# Patient Record
Sex: MALE | Race: WHITE | NOT HISPANIC OR LATINO
[De-identification: names, ages, dates, MRNs, and addresses within clinical notes are randomized per-mention and may not be internally consistent; named-entity substitution may affect disease eponyms.]

---

## 2022-03-01 ENCOUNTER — LABORATORY RESULT (OUTPATIENT)
Age: 62
End: 2022-03-01

## 2022-03-01 ENCOUNTER — APPOINTMENT (OUTPATIENT)
Dept: INTERNAL MEDICINE | Facility: CLINIC | Age: 62
End: 2022-03-01
Payer: COMMERCIAL

## 2022-03-01 VITALS
HEART RATE: 62 BPM | WEIGHT: 292 LBS | OXYGEN SATURATION: 97 % | BODY MASS INDEX: 34.48 KG/M2 | DIASTOLIC BLOOD PRESSURE: 87 MMHG | SYSTOLIC BLOOD PRESSURE: 147 MMHG | TEMPERATURE: 97.8 F | HEIGHT: 77 IN

## 2022-03-01 DIAGNOSIS — Z78.9 OTHER SPECIFIED HEALTH STATUS: ICD-10-CM

## 2022-03-01 DIAGNOSIS — G47.33 OBSTRUCTIVE SLEEP APNEA (ADULT) (PEDIATRIC): ICD-10-CM

## 2022-03-01 DIAGNOSIS — Z87.891 PERSONAL HISTORY OF NICOTINE DEPENDENCE: ICD-10-CM

## 2022-03-01 DIAGNOSIS — Z83.3 FAMILY HISTORY OF DIABETES MELLITUS: ICD-10-CM

## 2022-03-01 DIAGNOSIS — Z99.89 OBSTRUCTIVE SLEEP APNEA (ADULT) (PEDIATRIC): ICD-10-CM

## 2022-03-01 PROCEDURE — 99396 PREV VISIT EST AGE 40-64: CPT | Mod: 25

## 2022-03-01 PROCEDURE — 36415 COLL VENOUS BLD VENIPUNCTURE: CPT

## 2022-03-01 RX ORDER — FLUTICASONE FUROATE AND VILANTEROL TRIFENATATE 200; 25 UG/1; UG/1
200-25 POWDER RESPIRATORY (INHALATION)
Refills: 0 | Status: ACTIVE | COMMUNITY

## 2022-03-01 RX ORDER — ALBUTEROL SULFATE 90 UG/1
108 (90 BASE) INHALANT RESPIRATORY (INHALATION) 3 TIMES DAILY
Qty: 1 | Refills: 5 | Status: ACTIVE | COMMUNITY
Start: 2022-03-01 | End: 1900-01-01

## 2022-03-01 NOTE — HEALTH RISK ASSESSMENT
[Fair] : ~his/her~ current health as fair  [Good] : ~his/her~  mood as  good [Former] : Former [Yes] : Yes [No falls in past year] : Patient reported no falls in the past year [0] : 2) Feeling down, depressed, or hopeless: Not at all (0) [PHQ-2 Negative - No further assessment needed] : PHQ-2 Negative - No further assessment needed [HIV Test offered] : HIV Test offered [Hepatitis C test offered] : Hepatitis C test offered [With Family] : lives with family [# of Members in Household ___] :  household currently consist of [unfilled] member(s) [Employed] : employed [High School] : high school [] :  [Sexually Active] : sexually active [Fully functional (bathing, dressing, toileting, transferring, walking, feeding)] : Fully functional (bathing, dressing, toileting, transferring, walking, feeding) [Fully functional (using the telephone, shopping, preparing meals, housekeeping, doing laundry, using] : Fully functional and needs no help or supervision to perform IADLs (using the telephone, shopping, preparing meals, housekeeping, doing laundry, using transportation, managing medications and managing finances) [Reports normal functional visual acuity (ie: able to read med bottle)] : Reports normal functional visual acuity [Smoke Detector] : smoke detector [Carbon Monoxide Detector] : carbon monoxide detector [Safety elements used in home] : safety elements used in home [Seat Belt] :  uses seat belt [Sunscreen] : uses sunscreen [de-identified] : socially [[id number]] : 0 [Change in mental status noted] : No change in mental status noted [Language] : denies difficulty with language [Behavior] : denies difficulty with behavior [Handling Complex Tasks] : denies difficulty handling complex tasks [Learning/Retaining New Information] : denies difficulty learning/retaining new information [Spatial Ability and Orientation] : denies difficulty with spatial ability and orientation [High Risk Behavior] : no high risk behavior [Reports changes in hearing] : Reports no changes in hearing [Reports changes in vision] : Reports no changes in vision [Reports changes in dental health] : Reports no changes in dental health [Guns at Home] : no guns at home [TB Exposure] : is not being exposed to tuberculosis [ColonoscopyDate] : 2019 [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

## 2022-03-01 NOTE — HISTORY OF PRESENT ILLNESS
[de-identified] : Mr. SHAYNE PLASENCIA 61 year  male  with a PMH DM2, HTN, Hyperlipidemia, Asthma, S/p aortic  valve replacement, JAVIER on a CPAP   present to the office for a physical exam. Needs rx refills on his meds.   Patient feels good, denies complaints at present time.  Patient completed his cardiac rehab after surgery. Recently was seen by an endocrinologist(was told that he will add trulicity to his regimenet). Was seen by a pulmonologist, stop flovent, start Breo. As per patient feels better with his breathing. Patient mention that in January had a COVID 19, stay home for 5 days. Currently feels good. Needs refills on his meds\par

## 2022-03-01 NOTE — PLAN
[FreeTextEntry1] : Well adult exam is performed. Recommend  to do a blood test today\par For a HTN continue metoprolol, hyzaar, low salt diet, decrease caffeine intake\par For a DM2, continue janumet, glipizide, f/u with endo, podiatrist, ophtalmologist\par For a hyperlipidemia, continue low cholesterol diet, lipitor and zetia\par For an asthma, continue albuterol, breo\par F/u with a cardiologist\par  RTC to f/u in 2 wks. Patient is verbalized understanding\par

## 2022-03-03 ENCOUNTER — NON-APPOINTMENT (OUTPATIENT)
Age: 62
End: 2022-03-03

## 2022-03-03 LAB
ALBUMIN SERPL ELPH-MCNC: 4.7 G/DL
ALP BLD-CCNC: 76 U/L
ALT SERPL-CCNC: 22 U/L
ANION GAP SERPL CALC-SCNC: 18 MMOL/L
AST SERPL-CCNC: 21 U/L
BASOPHILS # BLD AUTO: 0.07 K/UL
BASOPHILS NFR BLD AUTO: 0.8 %
BILIRUB SERPL-MCNC: 0.3 MG/DL
BUN SERPL-MCNC: 21 MG/DL
CALCIUM SERPL-MCNC: 9.7 MG/DL
CHLORIDE SERPL-SCNC: 105 MMOL/L
CHOLEST SERPL-MCNC: 162 MG/DL
CO2 SERPL-SCNC: 21 MMOL/L
CREAT SERPL-MCNC: 1 MG/DL
EGFR: 85 ML/MIN/1.73M2
EOSINOPHIL # BLD AUTO: 0.24 K/UL
EOSINOPHIL NFR BLD AUTO: 2.6 %
ESTIMATED AVERAGE GLUCOSE: 148 MG/DL
GLUCOSE SERPL-MCNC: 81 MG/DL
HBA1C MFR BLD HPLC: 6.8 %
HBV SURFACE AB SER QL: REACTIVE
HBV SURFACE AG SER QL: NONREACTIVE
HCT VFR BLD CALC: 40.9 %
HCV AB SER QL: REACTIVE
HCV S/CO RATIO: 11.5 S/CO
HDLC SERPL-MCNC: 51 MG/DL
HGB BLD-MCNC: 13.3 G/DL
HIV1+2 AB SPEC QL IA.RAPID: NONREACTIVE
IMM GRANULOCYTES NFR BLD AUTO: 0.4 %
IRON SATN MFR SERPL: 22 %
IRON SERPL-MCNC: 74 UG/DL
LDLC SERPL CALC-MCNC: 76 MG/DL
LYMPHOCYTES # BLD AUTO: 2.32 K/UL
LYMPHOCYTES NFR BLD AUTO: 25.3 %
MAN DIFF?: NORMAL
MCHC RBC-ENTMCNC: 29.2 PG
MCHC RBC-ENTMCNC: 32.5 GM/DL
MCV RBC AUTO: 89.9 FL
MONOCYTES # BLD AUTO: 0.73 K/UL
MONOCYTES NFR BLD AUTO: 8 %
NEUTROPHILS # BLD AUTO: 5.76 K/UL
NEUTROPHILS NFR BLD AUTO: 62.9 %
NONHDLC SERPL-MCNC: 111 MG/DL
PLATELET # BLD AUTO: 230 K/UL
POTASSIUM SERPL-SCNC: 4.5 MMOL/L
PROT SERPL-MCNC: 7.2 G/DL
PSA FREE FLD-MCNC: 36 %
PSA FREE SERPL-MCNC: 0.22 NG/ML
PSA SERPL-MCNC: 0.62 NG/ML
RBC # BLD: 4.55 M/UL
RBC # FLD: 13.2 %
SODIUM SERPL-SCNC: 143 MMOL/L
TIBC SERPL-MCNC: 338 UG/DL
TRIGL SERPL-MCNC: 171 MG/DL
TSH SERPL-ACNC: 2.56 UIU/ML
UIBC SERPL-MCNC: 264 UG/DL
WBC # FLD AUTO: 9.16 K/UL

## 2022-08-10 ENCOUNTER — OUTPATIENT (OUTPATIENT)
Dept: OUTPATIENT SERVICES | Facility: HOSPITAL | Age: 62
LOS: 1 days | End: 2022-08-10

## 2022-08-10 PROCEDURE — 73030 X-RAY EXAM OF SHOULDER: CPT | Mod: 26,RT

## 2022-10-12 RX ORDER — MONTELUKAST 10 MG/1
10 TABLET, FILM COATED ORAL DAILY
Qty: 90 | Refills: 1 | Status: ACTIVE | COMMUNITY
Start: 2022-03-01 | End: 1900-01-01

## 2022-11-21 ENCOUNTER — APPOINTMENT (OUTPATIENT)
Dept: INTERNAL MEDICINE | Facility: CLINIC | Age: 62
End: 2022-11-21

## 2022-11-21 VITALS
BODY MASS INDEX: 38.74 KG/M2 | WEIGHT: 286 LBS | DIASTOLIC BLOOD PRESSURE: 83 MMHG | HEIGHT: 72 IN | TEMPERATURE: 97.6 F | SYSTOLIC BLOOD PRESSURE: 143 MMHG | OXYGEN SATURATION: 97 % | HEART RATE: 56 BPM

## 2022-11-21 VITALS — DIASTOLIC BLOOD PRESSURE: 80 MMHG | SYSTOLIC BLOOD PRESSURE: 135 MMHG

## 2022-11-21 DIAGNOSIS — B19.20 UNSPECIFIED VIRAL HEPATITIS C W/OUT HEPATIC COMA: ICD-10-CM

## 2022-11-21 DIAGNOSIS — Z13.21 ENCOUNTER FOR SCREENING FOR NUTRITIONAL DISORDER: ICD-10-CM

## 2022-11-21 PROCEDURE — 99214 OFFICE O/P EST MOD 30 MIN: CPT

## 2022-11-21 NOTE — HISTORY OF PRESENT ILLNESS
[FreeTextEntry1] : F/u exam [de-identified] : Mr. SHAYNE PLASENCIA 62 year male with a PMH DM2, HTN, Hyperlipidemia, Asthma, S/p aortic valve replacement, JAVIER on a CPAP present to the office for a f/u. Patient did not come to f/u for a long period of time due to insurance issue. As per patient he feels good, denies complaints. As per patient got a flu vaccine, shingrix vaccine and booster Pfizer\par Needs referral for  a blood test and to get refills on his meds

## 2022-11-21 NOTE — PLAN
[FreeTextEntry1] : F/u  exam is performed. Recommend  to do a blood test(will do another time, fasting), further management will depend on the blood test results.\par Continue present meds\par BP is well controlled continue hyzaar 50/12.5mg, metoprolol\par For DM2 continue janumet, glipizide. f/u with endo, podiatrist, ophtalmologist\par For a hyperlipidemia continue lipitor\par For an asthma continue breo elipta, albuterol, singulair.\par RTC to f/u in 2 wks after blood test. Patient is verbalized understanding\par

## 2022-12-29 ENCOUNTER — LABORATORY RESULT (OUTPATIENT)
Age: 62
End: 2022-12-29

## 2022-12-30 LAB
25(OH)D3 SERPL-MCNC: 52.7 NG/ML
ALBUMIN SERPL ELPH-MCNC: 4.5 G/DL
ALP BLD-CCNC: 101 U/L
ALT SERPL-CCNC: 21 U/L
ANION GAP SERPL CALC-SCNC: 11 MMOL/L
AST SERPL-CCNC: 17 U/L
BASOPHILS # BLD AUTO: 0.12 K/UL
BASOPHILS NFR BLD AUTO: 1 %
BILIRUB SERPL-MCNC: 0.2 MG/DL
BUN SERPL-MCNC: 30 MG/DL
CALCIUM SERPL-MCNC: 9.8 MG/DL
CHLORIDE SERPL-SCNC: 104 MMOL/L
CHOLEST SERPL-MCNC: 178 MG/DL
CO2 SERPL-SCNC: 26 MMOL/L
CREAT SERPL-MCNC: 1.18 MG/DL
CREAT SPEC-SCNC: 171 MG/DL
EGFR: 69 ML/MIN/1.73M2
EOSINOPHIL # BLD AUTO: 0.3 K/UL
EOSINOPHIL NFR BLD AUTO: 2.5 %
ESTIMATED AVERAGE GLUCOSE: 146 MG/DL
GLUCOSE SERPL-MCNC: 117 MG/DL
HBA1C MFR BLD HPLC: 6.7 %
HCT VFR BLD CALC: 43.8 %
HCV AB SER QL: REACTIVE
HCV RNA SERPL NAA+PROBE-LOG IU: NOT DETECTED LOGIU/ML
HCV S/CO RATIO: 10.29 S/CO
HDLC SERPL-MCNC: 46 MG/DL
HEPC RNA INTERP: NOT DETECTED
HGB BLD-MCNC: 13.7 G/DL
IMM GRANULOCYTES NFR BLD AUTO: 1.1 %
IRON SATN MFR SERPL: 21 %
IRON SERPL-MCNC: 58 UG/DL
LDLC SERPL CALC-MCNC: 103 MG/DL
LYMPHOCYTES # BLD AUTO: 3.19 K/UL
LYMPHOCYTES NFR BLD AUTO: 26.9 %
MAN DIFF?: NORMAL
MCHC RBC-ENTMCNC: 29.8 PG
MCHC RBC-ENTMCNC: 31.3 GM/DL
MCV RBC AUTO: 95.4 FL
MICROALBUMIN 24H UR DL<=1MG/L-MCNC: 1.3 MG/DL
MICROALBUMIN/CREAT 24H UR-RTO: 8 MG/G
MONOCYTES # BLD AUTO: 0.87 K/UL
MONOCYTES NFR BLD AUTO: 7.3 %
NEUTROPHILS # BLD AUTO: 7.24 K/UL
NEUTROPHILS NFR BLD AUTO: 61.2 %
NONHDLC SERPL-MCNC: 133 MG/DL
PLATELET # BLD AUTO: 263 K/UL
POTASSIUM SERPL-SCNC: 5.8 MMOL/L
PROT SERPL-MCNC: 6.9 G/DL
RBC # BLD: 4.59 M/UL
RBC # FLD: 13.3 %
SODIUM SERPL-SCNC: 141 MMOL/L
TIBC SERPL-MCNC: 284 UG/DL
TRIGL SERPL-MCNC: 148 MG/DL
TSH SERPL-ACNC: 2.19 UIU/ML
UIBC SERPL-MCNC: 226 UG/DL
WBC # FLD AUTO: 11.85 K/UL

## 2023-01-07 DIAGNOSIS — U07.1 COVID-19: ICD-10-CM

## 2023-03-01 ENCOUNTER — APPOINTMENT (OUTPATIENT)
Dept: INTERNAL MEDICINE | Facility: CLINIC | Age: 63
End: 2023-03-01

## 2023-03-01 ENCOUNTER — APPOINTMENT (OUTPATIENT)
Dept: INTERNAL MEDICINE | Facility: CLINIC | Age: 63
End: 2023-03-01
Payer: COMMERCIAL

## 2023-03-01 VITALS
WEIGHT: 272 LBS | BODY MASS INDEX: 36.84 KG/M2 | DIASTOLIC BLOOD PRESSURE: 88 MMHG | OXYGEN SATURATION: 98 % | HEART RATE: 58 BPM | HEIGHT: 72 IN | SYSTOLIC BLOOD PRESSURE: 135 MMHG

## 2023-03-01 DIAGNOSIS — J06.9 ACUTE UPPER RESPIRATORY INFECTION, UNSPECIFIED: ICD-10-CM

## 2023-03-01 PROCEDURE — 99213 OFFICE O/P EST LOW 20 MIN: CPT

## 2023-03-01 RX ORDER — BLOOD-GLUCOSE METER
EACH MISCELLANEOUS
Qty: 1 | Refills: 0 | Status: ACTIVE | COMMUNITY
Start: 2023-03-01 | End: 1900-01-01

## 2023-03-01 RX ORDER — BLOOD SUGAR DIAGNOSTIC
STRIP MISCELLANEOUS 3 TIMES DAILY
Qty: 1 | Refills: 1 | Status: ACTIVE | COMMUNITY
Start: 2023-03-01 | End: 1900-01-01

## 2023-03-01 RX ORDER — LANCETS 30 GAUGE
EACH MISCELLANEOUS
Qty: 100 | Refills: 1 | Status: ACTIVE | COMMUNITY
Start: 2023-03-01 | End: 1900-01-01

## 2023-03-01 RX ORDER — BLOOD-GLUCOSE METER
70 EACH MISCELLANEOUS
Qty: 1 | Refills: 2 | Status: ACTIVE | COMMUNITY
Start: 2023-03-01 | End: 1900-01-01

## 2023-03-02 RX ORDER — BLOOD-GLUCOSE METER
EACH MISCELLANEOUS
Qty: 1 | Refills: 2 | Status: ACTIVE | COMMUNITY
Start: 2023-03-02 | End: 1900-01-01

## 2023-03-02 RX ORDER — BLOOD-GLUCOSE METER
EACH MISCELLANEOUS
Qty: 1 | Refills: 1 | Status: ACTIVE | COMMUNITY
Start: 2023-03-02 | End: 1900-01-01

## 2023-03-02 RX ORDER — BLOOD-GLUCOSE METER
EACH MISCELLANEOUS
Qty: 1 | Refills: 0 | Status: ACTIVE | COMMUNITY
Start: 2023-03-02 | End: 1900-01-01

## 2023-06-12 ENCOUNTER — APPOINTMENT (OUTPATIENT)
Dept: INTERNAL MEDICINE | Facility: CLINIC | Age: 63
End: 2023-06-12
Payer: COMMERCIAL

## 2023-06-12 VITALS
TEMPERATURE: 97.8 F | HEIGHT: 72 IN | HEART RATE: 56 BPM | WEIGHT: 273 LBS | DIASTOLIC BLOOD PRESSURE: 80 MMHG | OXYGEN SATURATION: 98 % | SYSTOLIC BLOOD PRESSURE: 139 MMHG | BODY MASS INDEX: 36.98 KG/M2

## 2023-06-12 DIAGNOSIS — Z23 ENCOUNTER FOR IMMUNIZATION: ICD-10-CM

## 2023-06-12 PROCEDURE — 99214 OFFICE O/P EST MOD 30 MIN: CPT | Mod: 25

## 2023-06-12 PROCEDURE — G0009: CPT

## 2023-06-12 PROCEDURE — 36415 COLL VENOUS BLD VENIPUNCTURE: CPT

## 2023-06-12 PROCEDURE — 90677 PCV20 VACCINE IM: CPT

## 2023-06-12 RX ORDER — METFORMIN HYDROCHLORIDE 1000 MG/1
1000 TABLET, COATED ORAL
Refills: 0 | Status: ACTIVE | COMMUNITY

## 2023-06-12 RX ORDER — FLUTICASONE FUROATE, UMECLIDINIUM BROMIDE AND VILANTEROL TRIFENATATE 200; 62.5; 25 UG/1; UG/1; UG/1
POWDER RESPIRATORY (INHALATION)
Refills: 0 | Status: ACTIVE | COMMUNITY

## 2023-06-12 NOTE — PLAN
[FreeTextEntry1] : Mr. SHAYNE PLASENCIA 63 year male with a PMH DM2, HTN, Hyperlipidemia, Asthma, S/p aortic valve replacement, JAVIER on a CPAP present to the office for a f/u.\par F/u exam is performed. Recommend to do a blood test(will do another time, fasting), further management will depend on the blood test results.\par Continue present meds\par BP is well controlled continue hyzaar 50/12.5mg, metoprolol\par For DM2 continue metformin, monjuro,  f/u with endo, podiatrist, ophtalmologist\par For a hyperlipidemia continue lipitor\par For an asthma continue trelegy, albuterol\par Patient will get  a prevnar 20 today(side effect of the vaccine explained to the patient)\par RTC to f/u in 2 wks after blood test. Patient is verbalized understanding

## 2023-06-12 NOTE — HISTORY OF PRESENT ILLNESS
[FreeTextEntry1] : F/u exam [de-identified] : Mr. SHAYNE PLASENCIA 63 year male with a PMH DM2, HTN, Hyperlipidemia, Asthma, S/p aortic valve replacement, JAVIER on a CPAP present to the office for a f/u. Wants to do a blood test.  As per patient he feels good, denies complaints. Recently was seen by endocrinologist, made changes on DM2 meds. Start patient on Monjuro 7.5, metfformin. D/c janumet and glipizide\par Patient currently doing PT for a L knee pain. Feels better no more pain

## 2023-06-14 LAB
ALBUMIN SERPL ELPH-MCNC: 4.7 G/DL
ALP BLD-CCNC: 47 U/L
ALT SERPL-CCNC: 22 U/L
ANION GAP SERPL CALC-SCNC: 14 MMOL/L
AST SERPL-CCNC: 22 U/L
BILIRUB SERPL-MCNC: 0.3 MG/DL
BUN SERPL-MCNC: 27 MG/DL
CALCIUM SERPL-MCNC: 9.7 MG/DL
CHLORIDE SERPL-SCNC: 105 MMOL/L
CHOLEST SERPL-MCNC: 217 MG/DL
CO2 SERPL-SCNC: 22 MMOL/L
CREAT SERPL-MCNC: 1.36 MG/DL
EGFR: 58 ML/MIN/1.73M2
ESTIMATED AVERAGE GLUCOSE: 157 MG/DL
GLUCOSE SERPL-MCNC: 162 MG/DL
HBA1C MFR BLD HPLC: 7.1 %
HDLC SERPL-MCNC: 53 MG/DL
LDLC SERPL CALC-MCNC: 142 MG/DL
NONHDLC SERPL-MCNC: 163 MG/DL
POTASSIUM SERPL-SCNC: 4.9 MMOL/L
PROT SERPL-MCNC: 7.2 G/DL
SODIUM SERPL-SCNC: 142 MMOL/L
TRIGL SERPL-MCNC: 106 MG/DL
TSH SERPL-ACNC: 1.89 UIU/ML

## 2023-10-09 ENCOUNTER — APPOINTMENT (OUTPATIENT)
Dept: INTERNAL MEDICINE | Facility: CLINIC | Age: 63
End: 2023-10-09
Payer: COMMERCIAL

## 2023-10-09 VITALS
DIASTOLIC BLOOD PRESSURE: 67 MMHG | WEIGHT: 263 LBS | OXYGEN SATURATION: 96 % | HEIGHT: 72 IN | BODY MASS INDEX: 35.62 KG/M2 | SYSTOLIC BLOOD PRESSURE: 124 MMHG | HEART RATE: 59 BPM

## 2023-10-09 PROCEDURE — 36415 COLL VENOUS BLD VENIPUNCTURE: CPT

## 2023-10-09 PROCEDURE — 99213 OFFICE O/P EST LOW 20 MIN: CPT | Mod: 25

## 2023-10-12 LAB
ALBUMIN SERPL ELPH-MCNC: 4.6 G/DL
ALP BLD-CCNC: 43 U/L
ALT SERPL-CCNC: 23 U/L
ANION GAP SERPL CALC-SCNC: 11 MMOL/L
AST SERPL-CCNC: 21 U/L
BILIRUB DIRECT SERPL-MCNC: 0.2 MG/DL
BILIRUB INDIRECT SERPL-MCNC: 0.3 MG/DL
BILIRUB SERPL-MCNC: 0.5 MG/DL
BUN SERPL-MCNC: 27 MG/DL
CALCIUM SERPL-MCNC: 9.7 MG/DL
CHLORIDE SERPL-SCNC: 104 MMOL/L
CHOLEST SERPL-MCNC: 150 MG/DL
CO2 SERPL-SCNC: 25 MMOL/L
CREAT SERPL-MCNC: 1.32 MG/DL
EGFR: 60 ML/MIN/1.73M2
ESTIMATED AVERAGE GLUCOSE: 143 MG/DL
GLUCOSE SERPL-MCNC: 163 MG/DL
HBA1C MFR BLD HPLC: 6.6 %
HDLC SERPL-MCNC: 50 MG/DL
LDLC SERPL CALC-MCNC: 82 MG/DL
NONHDLC SERPL-MCNC: 100 MG/DL
POTASSIUM SERPL-SCNC: 4.6 MMOL/L
PROT SERPL-MCNC: 7.1 G/DL
SODIUM SERPL-SCNC: 141 MMOL/L
TRIGL SERPL-MCNC: 97 MG/DL

## 2024-02-24 ENCOUNTER — NON-APPOINTMENT (OUTPATIENT)
Age: 64
End: 2024-02-24

## 2024-03-07 ENCOUNTER — APPOINTMENT (OUTPATIENT)
Dept: INTERNAL MEDICINE | Facility: CLINIC | Age: 64
End: 2024-03-07
Payer: COMMERCIAL

## 2024-03-07 VITALS
HEIGHT: 72 IN | DIASTOLIC BLOOD PRESSURE: 77 MMHG | WEIGHT: 243 LBS | HEART RATE: 59 BPM | SYSTOLIC BLOOD PRESSURE: 123 MMHG | OXYGEN SATURATION: 98 % | BODY MASS INDEX: 32.91 KG/M2

## 2024-03-07 DIAGNOSIS — J45.909 UNSPECIFIED ASTHMA, UNCOMPLICATED: ICD-10-CM

## 2024-03-07 DIAGNOSIS — D72.829 ELEVATED WHITE BLOOD CELL COUNT, UNSPECIFIED: ICD-10-CM

## 2024-03-07 DIAGNOSIS — Z09 ENCOUNTER FOR FOLLOW-UP EXAMINATION AFTER COMPLETED TREATMENT FOR CONDITIONS OTHER THAN MALIGNANT NEOPLASM: ICD-10-CM

## 2024-03-07 DIAGNOSIS — Z12.5 ENCOUNTER FOR SCREENING FOR MALIGNANT NEOPLASM OF PROSTATE: ICD-10-CM

## 2024-03-07 PROCEDURE — 99496 TRANSJ CARE MGMT HIGH F2F 7D: CPT | Mod: 25

## 2024-03-07 PROCEDURE — 36415 COLL VENOUS BLD VENIPUNCTURE: CPT

## 2024-03-07 RX ORDER — ROSUVASTATIN CALCIUM 40 MG/1
40 TABLET, FILM COATED ORAL
Refills: 0 | Status: ACTIVE | COMMUNITY

## 2024-03-07 RX ORDER — LEVETIRACETAM 500 MG/1
500 TABLET, FILM COATED ORAL TWICE DAILY
Refills: 0 | Status: ACTIVE | COMMUNITY

## 2024-03-11 LAB
PSA FREE FLD-MCNC: 27 %
PSA FREE SERPL-MCNC: 0.49 NG/ML
PSA SERPL-MCNC: 1.8 NG/ML

## 2024-03-11 NOTE — HISTORY OF PRESENT ILLNESS
[Post-hospitalization from ___ Hospital] : Post-hospitalization from [unfilled] Hospital [Discharged on ___] : discharged on [unfilled] [Admitted on: ___] : The patient was admitted on [unfilled] [FreeTextEntry2] : Mr. SHAYNE PLASENCIA 63 year male with a PMH DM2, HTN, Hyperlipidemia, Asthma, S/p aortic valve replacement, JAVIER on a CPAP present to the office for a f/u after hospital d/c. As per patient on 03/02/24 had a seizure episode while sleeping, his wife called 911. Patient was transferred to [facility]. Did  ct scan of the head, MRI report, EEG, TTE, did blood test. For  a new onset of seizure start patient on keppra 500mg bid.. 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 CADASIL or other generic arteriopathies. Dz also with an acute kidney injury, after hydration renal function is improved. Creatinine level upon discharge on 03/04/24 was 1.13, Has a slight anemia Hb 11.3, HbA1C is 6.4, BNP is 634

## 2024-03-11 NOTE — PLAN
[FreeTextEntry1] : Mr. SHAYNE PLASENCIA 63 year male with a PMH DM2, HTN, Hyperlipidemia, Asthma, S/p aortic valve replacement, JAVIER on a CPAP present to the office for a f/u after hospital d/c. Continue present meds Seizure continue Keppra, f/u with neurologist. Avoid driving for 6 mo BP is well controlled continue hyzaar 50/12.5mg, metoprolol For DM2 continue metformin, monjuro, f/u with endo, podiatrist, ophtalmologist For a hyperlipidemia continue crestor, zetia, tricor For an asthma continue trelegy, albuterol F/u with GI - screening colonoscopy F/u with urologist(c/o nocturia).  RTC to f/u in 3 mo. Patient is verbalized understanding.

## 2024-03-11 NOTE — PHYSICAL EXAM
[TextEntry] : Constitutional: Well appearing, not in acute distress Head: Normocephalic, no lesions Eyes: PERRLA, conjunctiva is NL Ear: Ear canal is normal, tympanic membrane is intact Nose: Nasal turbinates are NL Throat: Clear, no exudates, no lesions Neck: Supple, no masses Chest: Lungs are clear, no rales, no rhonchi, no wheezing Heart: Regular rate, no murmurs Abdomen: Soft, no tenderness : No CVAT Neuro: AAO x 3, no focal neurological deficit.

## 2024-03-11 NOTE — REVIEW OF SYSTEMS
[Nocturia] : nocturia [Negative] : Heme/Lymph [Dysuria] : no dysuria [Frequency] : no frequency [Incontinence] : no incontinence

## 2024-03-14 ENCOUNTER — APPOINTMENT (OUTPATIENT)
Dept: INTERNAL MEDICINE | Facility: CLINIC | Age: 64
End: 2024-03-14
Payer: COMMERCIAL

## 2024-03-14 VITALS
DIASTOLIC BLOOD PRESSURE: 69 MMHG | SYSTOLIC BLOOD PRESSURE: 114 MMHG | BODY MASS INDEX: 32.91 KG/M2 | OXYGEN SATURATION: 97 % | HEART RATE: 61 BPM | WEIGHT: 243 LBS | HEIGHT: 72 IN

## 2024-03-14 DIAGNOSIS — I10 ESSENTIAL (PRIMARY) HYPERTENSION: ICD-10-CM

## 2024-03-14 DIAGNOSIS — E78.5 HYPERLIPIDEMIA, UNSPECIFIED: ICD-10-CM

## 2024-03-14 DIAGNOSIS — R56.9 UNSPECIFIED CONVULSIONS: ICD-10-CM

## 2024-03-14 DIAGNOSIS — E11.9 TYPE 2 DIABETES MELLITUS W/OUT COMPLICATIONS: ICD-10-CM

## 2024-03-14 PROCEDURE — 99204 OFFICE O/P NEW MOD 45 MIN: CPT

## 2024-03-14 RX ORDER — ATORVASTATIN CALCIUM 40 MG/1
40 TABLET, FILM COATED ORAL
Qty: 90 | Refills: 1 | Status: DISCONTINUED | COMMUNITY
End: 2024-03-14

## 2024-03-14 RX ORDER — TIRZEPATIDE 12.5 MG/.5ML
12.5 INJECTION, SOLUTION SUBCUTANEOUS
Refills: 0 | Status: ACTIVE | COMMUNITY

## 2024-03-14 RX ORDER — EZETIMIBE 10 MG/1
10 TABLET ORAL
Refills: 0 | Status: ACTIVE | COMMUNITY

## 2024-03-14 NOTE — ASSESSMENT
[FreeTextEntry1] : Colonoscopy full risk and benefits explained need to hold mounjaro and needs neuro clearance

## 2024-03-14 NOTE — PHYSICAL EXAM

## 2024-03-14 NOTE — HISTORY OF PRESENT ILLNESS
[FreeTextEntry1] : Mr. SHAYNE PLASENCIA 63 year male with a PMH DM2, HTN, Hyperlipidemia, Asthma, S/p bioprosthetic aortic valve replacement, JAVIER that has improved since losing weight. No h/o MI and normal Coronary arteries. S/p seizure disorder that started last week. He was hospitalized and is now seeing a neurologist. told has genetic disease. he is on seizure meds at present Here to schedule a screening colonoscopy. He has had a few colonoscopies in the past. Last colonoscopy a few years ago. He has had polyps in the past.   lost 50 pounds on Mounjaro.

## 2024-03-18 RX ORDER — METOPROLOL SUCCINATE 25 MG/1
25 TABLET, EXTENDED RELEASE ORAL
Qty: 90 | Refills: 1 | Status: ACTIVE | COMMUNITY
Start: 1900-01-01 | End: 1900-01-01

## 2024-03-18 RX ORDER — LOSARTAN POTASSIUM AND HYDROCHLOROTHIAZIDE 12.5; 5 MG/1; MG/1
50-12.5 TABLET ORAL DAILY
Qty: 90 | Refills: 1 | Status: ACTIVE | COMMUNITY
Start: 2022-03-01 | End: 1900-01-01

## 2024-03-27 DIAGNOSIS — Z00.00 ENCOUNTER FOR GENERAL ADULT MEDICAL EXAMINATION W/OUT ABNORMAL FINDINGS: ICD-10-CM

## 2024-04-02 ENCOUNTER — APPOINTMENT (OUTPATIENT)
Dept: UROLOGY | Facility: CLINIC | Age: 64
End: 2024-04-02
Payer: COMMERCIAL

## 2024-04-02 VITALS
TEMPERATURE: 97.5 F | OXYGEN SATURATION: 95 % | WEIGHT: 245 LBS | BODY MASS INDEX: 33.18 KG/M2 | DIASTOLIC BLOOD PRESSURE: 65 MMHG | SYSTOLIC BLOOD PRESSURE: 118 MMHG | HEIGHT: 72 IN | HEART RATE: 69 BPM

## 2024-04-02 DIAGNOSIS — R35.1 NOCTURIA: ICD-10-CM

## 2024-04-02 PROCEDURE — 99203 OFFICE O/P NEW LOW 30 MIN: CPT

## 2024-04-02 NOTE — PHYSICAL EXAM
[Well Groomed] : well groomed [Normal Appearance] : normal appearance [General Appearance - In No Acute Distress] : no acute distress [Edema] : no peripheral edema [Respiration, Rhythm And Depth] : normal respiratory rhythm and effort [Exaggerated Use Of Accessory Muscles For Inspiration] : no accessory muscle use [Abdomen Soft] : soft [Abdomen Tenderness] : non-tender [Costovertebral Angle Tenderness] : no ~M costovertebral angle tenderness [Urinary Bladder Findings] : the bladder was normal on palpation [Normal Station and Gait] : the gait and station were normal for the patient's age [] : no rash [No Focal Deficits] : no focal deficits [Affect] : the affect was normal [Oriented To Time, Place, And Person] : oriented to person, place, and time [Mood] : the mood was normal [No Palpable Adenopathy] : no palpable adenopathy

## 2024-04-02 NOTE — HISTORY OF PRESENT ILLNESS
[FreeTextEntry1] : 64y/o male here for nocturia X2/3. Denies h/o urinary stone, no FHx of PCA No episodes of hematuria. Last PSA 1.8

## 2024-04-02 NOTE — ASSESSMENT
[FreeTextEntry1] : 64y/o male here for nocturia X2/3. Denies h/o urinary stone, no FHx of PCA No episodes of hematuria. Last PSA 1.8  Prescribing Tamsulosin 0.4 Ordering TRUS to evaluate dutasteride  Collecting urine for UA and Culture  Will F/U in case of positive results

## 2024-04-08 LAB
APPEARANCE: CLEAR
BACTERIA UR CULT: NORMAL
BACTERIA: NEGATIVE /HPF
BILIRUBIN URINE: NEGATIVE
BLOOD URINE: NEGATIVE
CAST: 0 /LPF
COLOR: NORMAL
EPITHELIAL CELLS: 3 /HPF
GLUCOSE QUALITATIVE U: NEGATIVE MG/DL
KETONES URINE: ABNORMAL MG/DL
LEUKOCYTE ESTERASE URINE: ABNORMAL
MICROSCOPIC-UA: NORMAL
NITRITE URINE: NEGATIVE
PH URINE: 5.5
PROTEIN URINE: NEGATIVE MG/DL
RED BLOOD CELLS URINE: 1 /HPF
SPECIFIC GRAVITY URINE: 1.02
UROBILINOGEN URINE: 1 MG/DL
WHITE BLOOD CELLS URINE: 0 /HPF

## 2024-05-28 DIAGNOSIS — Z12.11 ENCOUNTER FOR SCREENING FOR MALIGNANT NEOPLASM OF COLON: ICD-10-CM

## 2024-05-28 DIAGNOSIS — D12.6 BENIGN NEOPLASM OF COLON, UNSPECIFIED: ICD-10-CM

## 2024-05-28 RX ORDER — SODIUM SULFATE, POTASSIUM SULFATE AND MAGNESIUM SULFATE 1.6; 3.13; 17.5 G/177ML; G/177ML; G/177ML
17.5-3.13-1.6 SOLUTION ORAL
Qty: 2 | Refills: 0 | Status: ACTIVE | COMMUNITY
Start: 2024-05-28 | End: 1900-01-01

## 2024-06-03 ENCOUNTER — APPOINTMENT (OUTPATIENT)
Dept: INTERNAL MEDICINE | Facility: AMBULATORY MEDICAL SERVICES | Age: 64
End: 2024-06-03
Payer: COMMERCIAL

## 2024-06-03 DIAGNOSIS — R26.89 OTHER ABNORMALITIES OF GAIT AND MOBILITY: ICD-10-CM

## 2024-06-03 DIAGNOSIS — F32.A DEPRESSION, UNSPECIFIED: ICD-10-CM

## 2024-06-03 PROCEDURE — 45385 COLONOSCOPY W/LESION REMOVAL: CPT | Mod: PT

## 2024-06-03 RX ORDER — ESCITALOPRAM OXALATE 5 MG/1
5 TABLET ORAL
Qty: 30 | Refills: 5 | Status: ACTIVE | COMMUNITY
Start: 2024-06-03 | End: 1900-01-01

## 2024-06-24 ENCOUNTER — APPOINTMENT (OUTPATIENT)
Dept: UROLOGY | Facility: CLINIC | Age: 64
End: 2024-06-24
Payer: COMMERCIAL

## 2024-06-24 VITALS
HEART RATE: 56 BPM | BODY MASS INDEX: 32.51 KG/M2 | TEMPERATURE: 97.8 F | OXYGEN SATURATION: 98 % | DIASTOLIC BLOOD PRESSURE: 57 MMHG | HEIGHT: 72 IN | SYSTOLIC BLOOD PRESSURE: 94 MMHG | WEIGHT: 240 LBS

## 2024-06-24 DIAGNOSIS — N40.0 BENIGN PROSTATIC HYPERPLASIA WITHOUT LOWER URINARY TRACT SYMPMS: ICD-10-CM

## 2024-06-24 PROCEDURE — 99213 OFFICE O/P EST LOW 20 MIN: CPT

## 2024-06-24 PROCEDURE — 76872 US TRANSRECTAL: CPT

## 2024-06-24 RX ORDER — DUTASTERIDE 0.5 MG/1
0.5 CAPSULE, LIQUID FILLED ORAL
Qty: 180 | Refills: 0 | Status: ACTIVE | COMMUNITY
Start: 2024-06-24 | End: 1900-01-01

## 2024-06-24 RX ORDER — TAMSULOSIN HYDROCHLORIDE 0.4 MG/1
0.4 CAPSULE ORAL
Qty: 180 | Refills: 0 | Status: ACTIVE | COMMUNITY
Start: 2024-04-02 | End: 1900-01-01

## 2024-06-24 NOTE — HISTORY OF PRESENT ILLNESS
[FreeTextEntry1] : 63y/o male here for nocturia X2/3. Denies h/o urinary stone, no FHx of PCA No episodes of hematuria. Last PSA 1.8 On tamsulosin, comes to office for TRUS.

## 2024-06-24 NOTE — ASSESSMENT
[FreeTextEntry1] : 63y/o male here for nocturia X2/3. Denies h/o urinary stone, no FHx of PCA No episodes of hematuria. Last PSA 1.8 On tamsulosin, comes to office for TRUS.  06/24/2024 - TRUS Prostate Volume: 29.5 cc Length : 51.5 mm Width : 48.1 mm Height : 22.8 mm Impression: normal prostate gland  Patient to start Dutasteride. Explained to the patient that dutasteride could cause loss of sexual desire and could be correlated to a decreased sensitivity of exams to prostate cancer (eg.: PSA) Will F/U in 6 months for uroflow + US prostate to check for any improvement.

## 2024-09-12 ENCOUNTER — RX RENEWAL (OUTPATIENT)
Age: 64
End: 2024-09-12

## 2024-10-01 ENCOUNTER — LABORATORY RESULT (OUTPATIENT)
Age: 64
End: 2024-10-01

## 2024-10-01 ENCOUNTER — APPOINTMENT (OUTPATIENT)
Dept: INTERNAL MEDICINE | Facility: CLINIC | Age: 64
End: 2024-10-01

## 2024-10-01 VITALS
WEIGHT: 230 LBS | HEIGHT: 72 IN | BODY MASS INDEX: 31.15 KG/M2 | SYSTOLIC BLOOD PRESSURE: 122 MMHG | HEART RATE: 58 BPM | DIASTOLIC BLOOD PRESSURE: 80 MMHG | OXYGEN SATURATION: 97 %

## 2024-10-01 DIAGNOSIS — I10 ESSENTIAL (PRIMARY) HYPERTENSION: ICD-10-CM

## 2024-10-01 DIAGNOSIS — F32.A DEPRESSION, UNSPECIFIED: ICD-10-CM

## 2024-10-01 DIAGNOSIS — D64.9 ANEMIA, UNSPECIFIED: ICD-10-CM

## 2024-10-01 DIAGNOSIS — N40.0 BENIGN PROSTATIC HYPERPLASIA WITHOUT LOWER URINARY TRACT SYMPMS: ICD-10-CM

## 2024-10-01 DIAGNOSIS — R56.9 UNSPECIFIED CONVULSIONS: ICD-10-CM

## 2024-10-01 DIAGNOSIS — E11.9 TYPE 2 DIABETES MELLITUS W/OUT COMPLICATIONS: ICD-10-CM

## 2024-10-01 DIAGNOSIS — D72.829 ELEVATED WHITE BLOOD CELL COUNT, UNSPECIFIED: ICD-10-CM

## 2024-10-01 DIAGNOSIS — E78.5 HYPERLIPIDEMIA, UNSPECIFIED: ICD-10-CM

## 2024-10-01 LAB
ALBUMIN SERPL ELPH-MCNC: 4.8 G/DL
ALP BLD-CCNC: 44 U/L
ALT SERPL-CCNC: 20 U/L
ANION GAP SERPL CALC-SCNC: 15 MMOL/L
APPEARANCE: ABNORMAL
AST SERPL-CCNC: 23 U/L
BASOPHILS # BLD AUTO: 0.06 K/UL
BASOPHILS NFR BLD AUTO: 0.7 %
BILIRUB SERPL-MCNC: 0.5 MG/DL
BILIRUBIN URINE: NEGATIVE
BLOOD URINE: NEGATIVE
BUN SERPL-MCNC: 23 MG/DL
CALCIUM SERPL-MCNC: 10.1 MG/DL
CHLORIDE SERPL-SCNC: 103 MMOL/L
CHOLEST SERPL-MCNC: 129 MG/DL
CO2 SERPL-SCNC: 23 MMOL/L
COLOR: NORMAL
CREAT SERPL-MCNC: 1.23 MG/DL
EGFR: 65 ML/MIN/1.73M2
EOSINOPHIL # BLD AUTO: 0.13 K/UL
EOSINOPHIL NFR BLD AUTO: 1.5 %
ESTIMATED AVERAGE GLUCOSE: 128 MG/DL
GLUCOSE QUALITATIVE U: NEGATIVE MG/DL
GLUCOSE SERPL-MCNC: 105 MG/DL
HBA1C MFR BLD HPLC: 6.1 %
HCT VFR BLD CALC: 37.2 %
HDLC SERPL-MCNC: 54 MG/DL
HGB BLD-MCNC: 12.5 G/DL
IMM GRANULOCYTES NFR BLD AUTO: 0.5 %
KETONES URINE: NEGATIVE MG/DL
LDLC SERPL CALC-MCNC: 59 MG/DL
LEUKOCYTE ESTERASE URINE: NEGATIVE
LYMPHOCYTES # BLD AUTO: 2.31 K/UL
LYMPHOCYTES NFR BLD AUTO: 26.8 %
MAN DIFF?: NORMAL
MCHC RBC-ENTMCNC: 29.8 PG
MCHC RBC-ENTMCNC: 33.6 GM/DL
MCV RBC AUTO: 88.8 FL
MONOCYTES # BLD AUTO: 0.7 K/UL
MONOCYTES NFR BLD AUTO: 8.1 %
NEUTROPHILS # BLD AUTO: 5.38 K/UL
NEUTROPHILS NFR BLD AUTO: 62.4 %
NITRITE URINE: NEGATIVE
NONHDLC SERPL-MCNC: 75 MG/DL
PH URINE: 5.5
PLATELET # BLD AUTO: 279 K/UL
POTASSIUM SERPL-SCNC: 5 MMOL/L
PROT SERPL-MCNC: 7.3 G/DL
PROTEIN URINE: NEGATIVE MG/DL
RBC # BLD: 4.19 M/UL
RBC # FLD: 12.6 %
SODIUM SERPL-SCNC: 140 MMOL/L
SPECIFIC GRAVITY URINE: 1.03
TRIGL SERPL-MCNC: 80 MG/DL
TSH SERPL-ACNC: 1.87 UIU/ML
UROBILINOGEN URINE: 0.2 MG/DL
WBC # FLD AUTO: 8.62 K/UL

## 2024-10-01 PROCEDURE — 90472 IMMUNIZATION ADMIN EACH ADD: CPT

## 2024-10-01 PROCEDURE — 90662 IIV NO PRSV INCREASED AG IM: CPT

## 2024-10-01 PROCEDURE — 90715 TDAP VACCINE 7 YRS/> IM: CPT | Mod: GY

## 2024-10-01 PROCEDURE — G0008: CPT

## 2024-10-01 PROCEDURE — 36415 COLL VENOUS BLD VENIPUNCTURE: CPT

## 2024-10-01 PROCEDURE — 99204 OFFICE O/P NEW MOD 45 MIN: CPT | Mod: 25

## 2024-10-01 RX ORDER — ESCITALOPRAM OXALATE 10 MG/1
10 TABLET ORAL
Qty: 90 | Refills: 1 | Status: ACTIVE | COMMUNITY
Start: 2024-10-01 | End: 1900-01-01

## 2024-10-01 NOTE — PLAN
[FreeTextEntry1] : Mr. SHAYNE PLASENCIA 63 year male with a PMH DM2, HTN, Hyperlipidemia, Asthma, S/p aortic valve replacement, JAVIER on a CPAP present to the office for a f/u. F/u exam is performed. Recommend to do a blood test today,  further management will depend on the blood test results. Continue present meds Seizure continue Keppra, f/u with neurologist.  HTN is well controlled continue hyzaar 50/12.5mg, metoprolol For DM2 continue metformin, monjuro,   f/u with endo, podiatrist, ophthalmologist For a hyperlipidemia continue crestor 40mg, zetia 10mg For an asthma continue trelegy Patient will get a flu vaccine and tdap vaccine today. Side effects of the vaccine explained to the patient prior. Patient denies allergy to the eggs RTC to f/u in 2 wks after blood test. Patient is verbalized understanding

## 2024-10-01 NOTE — END OF VISIT
[FreeTextEntry3] : Patient was seen and examine by an NP student accompany by a Dr. Espinal. Plan was discussed with the patient and resident. Agree with above

## 2024-10-01 NOTE — HISTORY OF PRESENT ILLNESS
[FreeTextEntry1] : F/u exam [de-identified] : Mr. SHAYNE PLASENCIA 64 year male with a PMH DM2, HTN, Hyperlipidemia, Asthma, S/p aortic valve replacement, JAVIER on a CPAP, seizure on 03/02/24  present to the office for a f/u. Wants to do a blood test.  Patient feeling well. No complaingts at this time.  Daughter is having a child in November and patient wants to receive flu vaccine, tetanus vaccine, & RSV vaccine during today's visit. Patient lost weight  on Monjuro(lost 60 lbs)  currently is on 1000mg . D/c janumet, trulicity and glipizide Needs rx refill on metformin, metoprolol and lexapro  Patient did colonoscopy on 06/03/2024 - s/p polypectomy. Tubular adenoma, diverticula

## 2024-10-05 LAB
IRON SATN MFR SERPL: 11 %
IRON SERPL-MCNC: 40 UG/DL
TIBC SERPL-MCNC: 355 UG/DL
UIBC SERPL-MCNC: 316 UG/DL

## 2025-01-21 ENCOUNTER — NON-APPOINTMENT (OUTPATIENT)
Age: 65
End: 2025-01-21

## 2025-01-21 ENCOUNTER — APPOINTMENT (OUTPATIENT)
Dept: UROLOGY | Facility: CLINIC | Age: 65
End: 2025-01-21
Payer: MEDICARE

## 2025-01-21 VITALS
RESPIRATION RATE: 17 BRPM | OXYGEN SATURATION: 100 % | SYSTOLIC BLOOD PRESSURE: 111 MMHG | WEIGHT: 234 LBS | HEART RATE: 53 BPM | BODY MASS INDEX: 31.69 KG/M2 | DIASTOLIC BLOOD PRESSURE: 70 MMHG | HEIGHT: 72 IN

## 2025-01-21 DIAGNOSIS — N40.0 BENIGN PROSTATIC HYPERPLASIA WITHOUT LOWER URINARY TRACT SYMPMS: ICD-10-CM

## 2025-01-21 PROCEDURE — 76872 US TRANSRECTAL: CPT

## 2025-01-21 PROCEDURE — 99203 OFFICE O/P NEW LOW 30 MIN: CPT

## 2025-01-21 RX ORDER — TAMSULOSIN HYDROCHLORIDE 0.4 MG/1
0.4 CAPSULE ORAL
Qty: 180 | Refills: 0 | Status: ACTIVE | COMMUNITY
Start: 2025-01-21 | End: 1900-01-01

## 2025-01-21 RX ORDER — DUTASTERIDE 0.5 MG/1
0.5 CAPSULE, LIQUID FILLED ORAL
Qty: 180 | Refills: 0 | Status: ACTIVE | COMMUNITY
Start: 2025-01-21 | End: 1900-01-01

## 2025-01-24 LAB
ALBUMIN SERPL ELPH-MCNC: 4.8 G/DL
ANION GAP SERPL CALC-SCNC: 11 MMOL/L
APPEARANCE: CLEAR
BACTERIA UR CULT: NORMAL
BILIRUBIN URINE: NEGATIVE
BLOOD URINE: NEGATIVE
BUN SERPL-MCNC: 26 MG/DL
CALCIUM SERPL-MCNC: 9.6 MG/DL
CHLORIDE SERPL-SCNC: 110 MMOL/L
CO2 SERPL-SCNC: 23 MMOL/L
COLOR: YELLOW
CREAT SERPL-MCNC: 1.25 MG/DL
EGFR: 64 ML/MIN/1.73M2
GLUCOSE QUALITATIVE U: NEGATIVE MG/DL
GLUCOSE SERPL-MCNC: 99 MG/DL
KETONES URINE: NEGATIVE MG/DL
LEUKOCYTE ESTERASE URINE: NEGATIVE
NITRITE URINE: NEGATIVE
PH URINE: 5
PHOSPHATE SERPL-MCNC: 3.5 MG/DL
POTASSIUM SERPL-SCNC: 4.9 MMOL/L
PROTEIN URINE: NEGATIVE MG/DL
PSA SERPL-MCNC: 0.16 NG/ML
SODIUM SERPL-SCNC: 144 MMOL/L
SPECIFIC GRAVITY URINE: 1.02
UROBILINOGEN URINE: 0.2 MG/DL

## 2025-02-03 ENCOUNTER — APPOINTMENT (OUTPATIENT)
Dept: INTERNAL MEDICINE | Facility: CLINIC | Age: 65
End: 2025-02-03
Payer: MEDICARE

## 2025-02-03 VITALS
BODY MASS INDEX: 31.56 KG/M2 | HEIGHT: 72 IN | OXYGEN SATURATION: 96 % | SYSTOLIC BLOOD PRESSURE: 118 MMHG | HEART RATE: 78 BPM | WEIGHT: 233 LBS | DIASTOLIC BLOOD PRESSURE: 73 MMHG

## 2025-02-03 DIAGNOSIS — I10 ESSENTIAL (PRIMARY) HYPERTENSION: ICD-10-CM

## 2025-02-03 DIAGNOSIS — E11.9 TYPE 2 DIABETES MELLITUS W/OUT COMPLICATIONS: ICD-10-CM

## 2025-02-03 DIAGNOSIS — D72.829 ELEVATED WHITE BLOOD CELL COUNT, UNSPECIFIED: ICD-10-CM

## 2025-02-03 DIAGNOSIS — E78.5 HYPERLIPIDEMIA, UNSPECIFIED: ICD-10-CM

## 2025-02-03 DIAGNOSIS — R56.9 UNSPECIFIED CONVULSIONS: ICD-10-CM

## 2025-02-03 DIAGNOSIS — D64.9 ANEMIA, UNSPECIFIED: ICD-10-CM

## 2025-02-03 DIAGNOSIS — N40.0 BENIGN PROSTATIC HYPERPLASIA WITHOUT LOWER URINARY TRACT SYMPMS: ICD-10-CM

## 2025-02-03 PROCEDURE — G2211 COMPLEX E/M VISIT ADD ON: CPT

## 2025-02-03 PROCEDURE — 99214 OFFICE O/P EST MOD 30 MIN: CPT

## 2025-02-03 PROCEDURE — 36415 COLL VENOUS BLD VENIPUNCTURE: CPT

## 2025-02-04 LAB
25(OH)D3 SERPL-MCNC: 62.5 NG/ML
ALBUMIN SERPL ELPH-MCNC: 4.6 G/DL
ALP BLD-CCNC: 39 U/L
ALT SERPL-CCNC: 16 U/L
ANION GAP SERPL CALC-SCNC: 13 MMOL/L
AST SERPL-CCNC: 21 U/L
BILIRUB SERPL-MCNC: 0.3 MG/DL
BUN SERPL-MCNC: 22 MG/DL
CALCIUM SERPL-MCNC: 9.9 MG/DL
CHLORIDE SERPL-SCNC: 107 MMOL/L
CHOLEST SERPL-MCNC: 146 MG/DL
CO2 SERPL-SCNC: 25 MMOL/L
CREAT SERPL-MCNC: 1.24 MG/DL
EGFR: 64 ML/MIN/1.73M2
ESTIMATED AVERAGE GLUCOSE: 126 MG/DL
GLUCOSE SERPL-MCNC: 112 MG/DL
HBA1C MFR BLD HPLC: 6 %
HCT VFR BLD CALC: 38.2 %
HDLC SERPL-MCNC: 58 MG/DL
HGB BLD-MCNC: 12 G/DL
IRON SATN MFR SERPL: 19 %
IRON SERPL-MCNC: 62 UG/DL
LDLC SERPL CALC-MCNC: 77 MG/DL
MCHC RBC-ENTMCNC: 29.5 PG
MCHC RBC-ENTMCNC: 31.4 G/DL
MCV RBC AUTO: 93.9 FL
NONHDLC SERPL-MCNC: 87 MG/DL
PLATELET # BLD AUTO: 259 K/UL
POTASSIUM SERPL-SCNC: 5.1 MMOL/L
PROT SERPL-MCNC: 7 G/DL
RBC # BLD: 4.07 M/UL
RBC # FLD: 13.3 %
SODIUM SERPL-SCNC: 145 MMOL/L
TIBC SERPL-MCNC: 334 UG/DL
TRIGL SERPL-MCNC: 50 MG/DL
TSH SERPL-ACNC: 3.13 UIU/ML
UIBC SERPL-MCNC: 272 UG/DL
WBC # FLD AUTO: 6.83 K/UL

## 2025-02-20 ENCOUNTER — APPOINTMENT (OUTPATIENT)
Dept: INTERNAL MEDICINE | Facility: CLINIC | Age: 65
End: 2025-02-20
Payer: MEDICARE

## 2025-02-20 VITALS
OXYGEN SATURATION: 96 % | HEART RATE: 56 BPM | WEIGHT: 233 LBS | SYSTOLIC BLOOD PRESSURE: 102 MMHG | HEIGHT: 72 IN | BODY MASS INDEX: 31.56 KG/M2 | TEMPERATURE: 98 F | DIASTOLIC BLOOD PRESSURE: 64 MMHG

## 2025-02-20 DIAGNOSIS — E78.5 HYPERLIPIDEMIA, UNSPECIFIED: ICD-10-CM

## 2025-02-20 DIAGNOSIS — E11.9 TYPE 2 DIABETES MELLITUS W/OUT COMPLICATIONS: ICD-10-CM

## 2025-02-20 PROCEDURE — 36415 COLL VENOUS BLD VENIPUNCTURE: CPT

## 2025-02-20 PROCEDURE — 99214 OFFICE O/P EST MOD 30 MIN: CPT

## 2025-02-25 ENCOUNTER — APPOINTMENT (OUTPATIENT)
Dept: INTERNAL MEDICINE | Facility: CLINIC | Age: 65
End: 2025-02-25
Payer: MEDICARE

## 2025-02-25 VITALS
OXYGEN SATURATION: 97 % | SYSTOLIC BLOOD PRESSURE: 92 MMHG | WEIGHT: 230 LBS | BODY MASS INDEX: 31.15 KG/M2 | HEIGHT: 72 IN | HEART RATE: 62 BPM | DIASTOLIC BLOOD PRESSURE: 70 MMHG

## 2025-02-25 DIAGNOSIS — D12.6 BENIGN NEOPLASM OF COLON, UNSPECIFIED: ICD-10-CM

## 2025-02-25 DIAGNOSIS — I10 ESSENTIAL (PRIMARY) HYPERTENSION: ICD-10-CM

## 2025-02-25 DIAGNOSIS — D64.9 ANEMIA, UNSPECIFIED: ICD-10-CM

## 2025-02-25 LAB
BASOPHILS # BLD AUTO: 0.08 K/UL
BASOPHILS NFR BLD AUTO: 1.1 %
EOSINOPHIL # BLD AUTO: 0.22 K/UL
EOSINOPHIL NFR BLD AUTO: 3 %
FERRITIN SERPL-MCNC: 202 NG/ML
FOLATE SERPL-MCNC: >20 NG/ML
HCT VFR BLD CALC: 40.2 %
HGB BLD-MCNC: 12.9 G/DL
IMM GRANULOCYTES NFR BLD AUTO: 0.3 %
IRON SATN MFR SERPL: 25 %
IRON SERPL-MCNC: 90 UG/DL
LYMPHOCYTES # BLD AUTO: 2.62 K/UL
LYMPHOCYTES NFR BLD AUTO: 36.2 %
MAN DIFF?: NORMAL
MCHC RBC-ENTMCNC: 29.7 PG
MCHC RBC-ENTMCNC: 32.1 G/DL
MCV RBC AUTO: 92.6 FL
MONOCYTES # BLD AUTO: 0.49 K/UL
MONOCYTES NFR BLD AUTO: 6.8 %
NEUTROPHILS # BLD AUTO: 3.8 K/UL
NEUTROPHILS NFR BLD AUTO: 52.6 %
PLATELET # BLD AUTO: 268 K/UL
RBC # BLD: 4.34 M/UL
RBC # FLD: 12.9 %
TIBC SERPL-MCNC: 362 UG/DL
UIBC SERPL-MCNC: 272 UG/DL
VIT B12 SERPL-MCNC: 743 PG/ML
WBC # FLD AUTO: 7.23 K/UL

## 2025-02-25 PROCEDURE — 99213 OFFICE O/P EST LOW 20 MIN: CPT

## 2025-06-09 ENCOUNTER — INPATIENT (INPATIENT)
Facility: HOSPITAL | Age: 65
LOS: 2 days | Discharge: HOME | DRG: 69 | End: 2025-06-12
Attending: STUDENT IN AN ORGANIZED HEALTH CARE EDUCATION/TRAINING PROGRAM | Admitting: STUDENT IN AN ORGANIZED HEALTH CARE EDUCATION/TRAINING PROGRAM
Payer: MEDICARE

## 2025-06-09 VITALS
DIASTOLIC BLOOD PRESSURE: 78 MMHG | HEART RATE: 65 BPM | SYSTOLIC BLOOD PRESSURE: 121 MMHG | RESPIRATION RATE: 16 BRPM | OXYGEN SATURATION: 98 % | TEMPERATURE: 99 F | WEIGHT: 220.02 LBS

## 2025-06-09 DIAGNOSIS — G40.909 EPILEPSY, UNSPECIFIED, NOT INTRACTABLE, WITHOUT STATUS EPILEPTICUS: ICD-10-CM

## 2025-06-09 DIAGNOSIS — E78.5 HYPERLIPIDEMIA, UNSPECIFIED: ICD-10-CM

## 2025-06-09 DIAGNOSIS — N40.0 BENIGN PROSTATIC HYPERPLASIA WITHOUT LOWER URINARY TRACT SYMPTOMS: ICD-10-CM

## 2025-06-09 DIAGNOSIS — G93.40 ENCEPHALOPATHY, UNSPECIFIED: ICD-10-CM

## 2025-06-09 DIAGNOSIS — Z29.9 ENCOUNTER FOR PROPHYLACTIC MEASURES, UNSPECIFIED: ICD-10-CM

## 2025-06-09 DIAGNOSIS — I67.850 CEREBRAL AUTOSOMAL DOMINANT ARTERIOPATHY WITH SUBCORTICAL INFARCTS AND LEUKOENCEPHALOPATHY: ICD-10-CM

## 2025-06-09 DIAGNOSIS — R56.9 UNSPECIFIED CONVULSIONS: ICD-10-CM

## 2025-06-09 DIAGNOSIS — E11.9 TYPE 2 DIABETES MELLITUS WITHOUT COMPLICATIONS: ICD-10-CM

## 2025-06-09 DIAGNOSIS — R41.82 ALTERED MENTAL STATUS, UNSPECIFIED: ICD-10-CM

## 2025-06-09 DIAGNOSIS — I10 ESSENTIAL (PRIMARY) HYPERTENSION: ICD-10-CM

## 2025-06-09 LAB
ALBUMIN SERPL ELPH-MCNC: 4 G/DL — SIGNIFICANT CHANGE UP (ref 3.5–5)
ALP SERPL-CCNC: 37 U/L — LOW (ref 40–120)
ALT FLD-CCNC: 25 U/L DA — SIGNIFICANT CHANGE UP (ref 10–60)
ANION GAP SERPL CALC-SCNC: 2 MMOL/L — LOW (ref 5–17)
APPEARANCE UR: CLEAR — SIGNIFICANT CHANGE UP
AST SERPL-CCNC: 21 U/L — SIGNIFICANT CHANGE UP (ref 10–40)
BASOPHILS # BLD AUTO: 0.01 K/UL — SIGNIFICANT CHANGE UP (ref 0–0.2)
BASOPHILS NFR BLD AUTO: 0.1 % — SIGNIFICANT CHANGE UP (ref 0–2)
BILIRUB SERPL-MCNC: 0.4 MG/DL — SIGNIFICANT CHANGE UP (ref 0.2–1.2)
BILIRUB UR-MCNC: NEGATIVE — SIGNIFICANT CHANGE UP
BUN SERPL-MCNC: 28 MG/DL — HIGH (ref 7–18)
CALCIUM SERPL-MCNC: 10.1 MG/DL — SIGNIFICANT CHANGE UP (ref 8.4–10.5)
CHLORIDE SERPL-SCNC: 107 MMOL/L — SIGNIFICANT CHANGE UP (ref 96–108)
CK SERPL-CCNC: 49 U/L — SIGNIFICANT CHANGE UP (ref 35–232)
CO2 SERPL-SCNC: 30 MMOL/L — SIGNIFICANT CHANGE UP (ref 22–31)
COLOR SPEC: YELLOW — SIGNIFICANT CHANGE UP
CREAT SERPL-MCNC: 1.28 MG/DL — SIGNIFICANT CHANGE UP (ref 0.5–1.3)
CRP SERPL-MCNC: <2.9 MG/L — SIGNIFICANT CHANGE UP (ref 0–5)
DIFF PNL FLD: NEGATIVE — SIGNIFICANT CHANGE UP
EGFR: 61 ML/MIN/1.73M2 — SIGNIFICANT CHANGE UP
EGFR: 61 ML/MIN/1.73M2 — SIGNIFICANT CHANGE UP
EOSINOPHIL # BLD AUTO: 0.01 K/UL — SIGNIFICANT CHANGE UP (ref 0–0.5)
EOSINOPHIL NFR BLD AUTO: 0.1 % — SIGNIFICANT CHANGE UP (ref 0–6)
ERYTHROCYTE [SEDIMENTATION RATE] IN BLOOD: 9 MM/HR — SIGNIFICANT CHANGE UP (ref 0–15)
GLUCOSE SERPL-MCNC: 131 MG/DL — HIGH (ref 70–99)
GLUCOSE UR QL: NEGATIVE MG/DL — SIGNIFICANT CHANGE UP
HCT VFR BLD CALC: 36.5 % — LOW (ref 39–50)
HGB BLD-MCNC: 12.1 G/DL — LOW (ref 13–17)
IMM GRANULOCYTES NFR BLD AUTO: 0.4 % — SIGNIFICANT CHANGE UP (ref 0–0.9)
KETONES UR QL: NEGATIVE MG/DL — SIGNIFICANT CHANGE UP
LEUKOCYTE ESTERASE UR-ACNC: NEGATIVE — SIGNIFICANT CHANGE UP
LIDOCAIN IGE QN: 62 U/L — SIGNIFICANT CHANGE UP (ref 13–75)
LYMPHOCYTES # BLD AUTO: 0.88 K/UL — LOW (ref 1–3.3)
LYMPHOCYTES # BLD AUTO: 12.2 % — LOW (ref 13–44)
MCHC RBC-ENTMCNC: 29.2 PG — SIGNIFICANT CHANGE UP (ref 27–34)
MCHC RBC-ENTMCNC: 33.2 G/DL — SIGNIFICANT CHANGE UP (ref 32–36)
MCV RBC AUTO: 88 FL — SIGNIFICANT CHANGE UP (ref 80–100)
MONOCYTES # BLD AUTO: 0.28 K/UL — SIGNIFICANT CHANGE UP (ref 0–0.9)
MONOCYTES NFR BLD AUTO: 3.9 % — SIGNIFICANT CHANGE UP (ref 2–14)
NEUTROPHILS # BLD AUTO: 6.01 K/UL — SIGNIFICANT CHANGE UP (ref 1.8–7.4)
NEUTROPHILS NFR BLD AUTO: 83.3 % — HIGH (ref 43–77)
NITRITE UR-MCNC: NEGATIVE — SIGNIFICANT CHANGE UP
NRBC BLD AUTO-RTO: 0 /100 WBCS — SIGNIFICANT CHANGE UP (ref 0–0)
PH UR: 7 — SIGNIFICANT CHANGE UP (ref 5–8)
PLATELET # BLD AUTO: 229 K/UL — SIGNIFICANT CHANGE UP (ref 150–400)
POTASSIUM SERPL-MCNC: 4.5 MMOL/L — SIGNIFICANT CHANGE UP (ref 3.5–5.3)
POTASSIUM SERPL-SCNC: 4.5 MMOL/L — SIGNIFICANT CHANGE UP (ref 3.5–5.3)
PROT SERPL-MCNC: 7.5 G/DL — SIGNIFICANT CHANGE UP (ref 6–8.3)
PROT UR-MCNC: NEGATIVE MG/DL — SIGNIFICANT CHANGE UP
RBC # BLD: 4.15 M/UL — LOW (ref 4.2–5.8)
RBC # FLD: 12.9 % — SIGNIFICANT CHANGE UP (ref 10.3–14.5)
SODIUM SERPL-SCNC: 139 MMOL/L — SIGNIFICANT CHANGE UP (ref 135–145)
SP GR SPEC: 1.05 — HIGH (ref 1–1.03)
UROBILINOGEN FLD QL: 0.2 MG/DL — SIGNIFICANT CHANGE UP (ref 0.2–1)
WBC # BLD: 7.22 K/UL — SIGNIFICANT CHANGE UP (ref 3.8–10.5)
WBC # FLD AUTO: 7.22 K/UL — SIGNIFICANT CHANGE UP (ref 3.8–10.5)

## 2025-06-09 PROCEDURE — 70498 CT ANGIOGRAPHY NECK: CPT | Mod: 26

## 2025-06-09 PROCEDURE — 70450 CT HEAD/BRAIN W/O DYE: CPT | Mod: 26,XU

## 2025-06-09 PROCEDURE — 70551 MRI BRAIN STEM W/O DYE: CPT | Mod: 26

## 2025-06-09 PROCEDURE — 0042T: CPT

## 2025-06-09 PROCEDURE — 71045 X-RAY EXAM CHEST 1 VIEW: CPT | Mod: 26

## 2025-06-09 PROCEDURE — 99291 CRITICAL CARE FIRST HOUR: CPT

## 2025-06-09 PROCEDURE — 99223 1ST HOSP IP/OBS HIGH 75: CPT | Mod: GC

## 2025-06-09 PROCEDURE — 70496 CT ANGIOGRAPHY HEAD: CPT | Mod: 26

## 2025-06-09 RX ORDER — MAGNESIUM, ALUMINUM HYDROXIDE 200-200 MG
30 TABLET,CHEWABLE ORAL EVERY 4 HOURS
Refills: 0 | Status: DISCONTINUED | OUTPATIENT
Start: 2025-06-09 | End: 2025-06-12

## 2025-06-09 RX ORDER — FENOFIBRATE 160 MG/1
145 TABLET ORAL DAILY
Refills: 0 | Status: DISCONTINUED | OUTPATIENT
Start: 2025-06-09 | End: 2025-06-12

## 2025-06-09 RX ORDER — METOPROLOL SUCCINATE 50 MG/1
25 TABLET, EXTENDED RELEASE ORAL DAILY
Refills: 0 | Status: DISCONTINUED | OUTPATIENT
Start: 2025-06-09 | End: 2025-06-12

## 2025-06-09 RX ORDER — SODIUM CHLORIDE 9 G/1000ML
1000 INJECTION, SOLUTION INTRAVENOUS ONCE
Refills: 0 | Status: COMPLETED | OUTPATIENT
Start: 2025-06-09 | End: 2025-06-09

## 2025-06-09 RX ORDER — LOSARTAN POTASSIUM 100 MG/1
50 TABLET, FILM COATED ORAL DAILY
Refills: 0 | Status: DISCONTINUED | OUTPATIENT
Start: 2025-06-09 | End: 2025-06-12

## 2025-06-09 RX ORDER — ROSUVASTATIN CALCIUM 20 MG/1
40 TABLET, FILM COATED ORAL AT BEDTIME
Refills: 0 | Status: DISCONTINUED | OUTPATIENT
Start: 2025-06-09 | End: 2025-06-12

## 2025-06-09 RX ORDER — ONDANSETRON HCL/PF 4 MG/2 ML
4 VIAL (ML) INJECTION EVERY 8 HOURS
Refills: 0 | Status: DISCONTINUED | OUTPATIENT
Start: 2025-06-09 | End: 2025-06-12

## 2025-06-09 RX ORDER — LEVETIRACETAM 10 MG/ML
500 INJECTION, SOLUTION INTRAVENOUS
Refills: 0 | Status: DISCONTINUED | OUTPATIENT
Start: 2025-06-09 | End: 2025-06-12

## 2025-06-09 RX ORDER — ESCITALOPRAM OXALATE 20 MG/1
10 TABLET ORAL DAILY
Refills: 0 | Status: DISCONTINUED | OUTPATIENT
Start: 2025-06-09 | End: 2025-06-12

## 2025-06-09 RX ORDER — MELATONIN 5 MG
3 TABLET ORAL AT BEDTIME
Refills: 0 | Status: DISCONTINUED | OUTPATIENT
Start: 2025-06-09 | End: 2025-06-12

## 2025-06-09 RX ORDER — EZETIMIBE 10 MG/1
10 TABLET ORAL DAILY
Refills: 0 | Status: DISCONTINUED | OUTPATIENT
Start: 2025-06-09 | End: 2025-06-12

## 2025-06-09 RX ORDER — ASPIRIN 325 MG
81 TABLET ORAL DAILY
Refills: 0 | Status: DISCONTINUED | OUTPATIENT
Start: 2025-06-09 | End: 2025-06-12

## 2025-06-09 RX ORDER — LEVETIRACETAM 10 MG/ML
1000 INJECTION, SOLUTION INTRAVENOUS ONCE
Refills: 0 | Status: DISCONTINUED | OUTPATIENT
Start: 2025-06-09 | End: 2025-06-09

## 2025-06-09 RX ORDER — FINASTERIDE 1 MG/1
5 TABLET, FILM COATED ORAL DAILY
Refills: 0 | Status: DISCONTINUED | OUTPATIENT
Start: 2025-06-09 | End: 2025-06-12

## 2025-06-09 RX ORDER — TAMSULOSIN HYDROCHLORIDE 0.4 MG/1
0.4 CAPSULE ORAL AT BEDTIME
Refills: 0 | Status: DISCONTINUED | OUTPATIENT
Start: 2025-06-09 | End: 2025-06-12

## 2025-06-09 RX ADMIN — LEVETIRACETAM 1000 MILLIGRAM(S): 10 INJECTION, SOLUTION INTRAVENOUS at 16:48

## 2025-06-09 RX ADMIN — Medication 1000 MILLILITER(S): at 16:51

## 2025-06-09 NOTE — H&P ADULT - ATTENDING COMMENTS
I have seen and evaluated the patient in the Emergency Department. The patient is unable to provide much history given his confusion. He does not know he got to the hospital or what has been going on and just replies “they brought me here.”     His wife says that he was in his usual health yesterday morning. He is a retired cook. He normally wakes up, likes to garden, does smoke marijuana daily but never confuses family members and is always oriented.      Yesterday evening, the patient started vomiting yellow liquid. He then went to a birthday dinner for his daughter and had a few beers and appetizers. Then when he got home he vomited about 6-7 times more, no blood. He denies abdominal pain, fever, diarrhea.     Then this morning, his wife says he came down the stairs in his swim trunks. He was asking where his brother was (who lives in [place]). He was not making any sense and was disoriented, so he was brought to the ED.      On the exam, he is reclining in bed in no distress. He is awake and alert but oriented only to name and “hospital.” He thinks it is the fall of 2022 and thinks Jose David Thomson is president. He is able to quickly perform basic math but cannot answer short-term recall questions. No motor deficits. No sensory deficits. Finger-nose-finger testing normal. His abdomen is soft and nontender. He does have a warm erythematous rash on his chest and abdomen but there is a sharp demarcation where the shorts line is and family confirms he was laying out 2 days ago and got a bad sunburn.      I have reviewed his CBC, BMP. No leukocytosis. Lymphopenia is noted. BUN is elevated to 28. Cr 1.28. LFTs normal. Urinalysis without pyruia.      I have personally reviewed CT head – no hemorrhage.      I have called and discussed with outside Neurologist Dr. Marce Keith from [facility] who recommends MR non-con Brain and then if unrevealing initial MRI Brain and infectious workup, would consider LP.           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 He is alert and protecting his airway. No hemorrhage or large vessel occlusion identified on neuroimaging. The patient’s neurologist is worried about an acute infectious process that is precipitating confusion in someone with decreased neurologic reserved given his Cadasil disease, verse less likely, an infarct of the area postrema leading to cyclic vomiting. Another less likely possibility is cannabinoid hyperemesis that has now triggered encephalopathy – patient does smoke marijuana daily.  However, this would be his first presentation of cannabinoid hyperemesis.      #Acute Encephalopathy (Infectious vs CVA)   #Cadasil Syndrome   #C/f Viral Gastroenteritis vs foodborne illness vs cannabinoid hyperemesis    #DM   #HTN   #Seizure Disorder on Keppra        -admit to medicine   -MR Non-con Brain (hopefully to obtain tonight)   -Neurology consultation: Dr. Bains (outside Neurologist cell phone number of Dr. Keith is [phone], she is happy to provide recommendations and would like to be notified of MR Brain results   -1 L fluid bolus (vomiting and elevated BUN)   -abdominal exam benign, will hold on CT abdomen   -continue home seizure medication   -if diagnostic workup unrevealing, consider LP    -obtain urine drug screen             -rest of plan per resident note

## 2025-06-09 NOTE — ED ADULT NURSE NOTE - NSFALLUNIVINTERV_ED_ALL_ED
Bed/Stretcher in lowest position, wheels locked, appropriate side rails in place/Call bell, personal items and telephone in reach/Instruct patient to call for assistance before getting out of bed/chair/stretcher/Non-slip footwear applied when patient is off stretcher/[place] to call system/Physically safe environment - no spills, clutter or unnecessary equipment/Purposeful proactive rounding/Room/bathroom lighting operational, light cord in reach

## 2025-06-09 NOTE — ED PROVIDER NOTE - OBJECTIVE STATEMENT
65 yr old male with hx of cadasil disease, HTN, BPH, DM, seizure on keppra 500mg BID, HLD presents to ed c/o ams this am. vomiting NBNB since last night. no slurred speech, no cp, no abd pain, no dysuria, no diarrhea, no focal weakness, no visual changes.

## 2025-06-09 NOTE — ED PROVIDER NOTE - CLINICAL SUMMARY MEDICAL DECISION MAKING FREE TEXT BOX
65 yr old male with hx of cadasil disease, HTN, BPH, DM, seizure on keppra 500mg BID, HLD presents to ed c/o ams this am. vomiting NBNB since last night. no slurred speech, no cp, no abd pain, no dysuria, no diarrhea, no focal weakness, no visual changes.     ams r/o lytes imbalance vs uti vs vasculitis vs seizure? -labs, ekg, cta, keppra, monitor.

## 2025-06-09 NOTE — ED ADULT NURSE NOTE - ED STAT RN HANDOFF DETAILS
Patient alert, family at bedside, admitted to medicine pending MAR to see. Plan of care ongoing, endorsed to Juve CAMACHO

## 2025-06-09 NOTE — H&P ADULT - PROBLEM SELECTOR PLAN 1
one day of worsening cognitive impairment + vomiting  UA negative, wbc wnl; nonseptic; no meningeal signs; no headache, no photophobia   CTh wnl     f/u MR brain   consider LP if no improvement or MR is nondiagnostic     rest as below

## 2025-06-09 NOTE — ED ADULT NURSE NOTE - SUICIDE SCREENING DEPRESSION
I have personally seen and examined this patient.  I have fully participated in the care of this patient. I have reviewed all pertinent clinical information, including history, physical exam, plan and the Resident’s note and agree except as noted.
Negative

## 2025-06-09 NOTE — H&P ADULT - PROBLEM SELECTOR PLAN 6
c/w home losartan/hctz w holding parameters c/w home losartan/hctz, metoprolol w holding parameters    *pt med list says losartan/hctz 12.5 -- confirm dose of losartan component

## 2025-06-09 NOTE — H&P ADULT - PROBLEM SELECTOR PLAN 8
not indicated at this time     IMPROVE VTE Individual Risk Assessment    RISK                                                                Points  [  ] Previous VTE                                                  3  [  ] Thrombophilia                                               2  [  ] Lower limb paralysis                                      2        (unable to hold up >15 seconds)    [  ] Current Cancer                                              2         (within 6 months)  [  ] Immobilization > 24 hrs                                1  [  ] ICU/CCU stay > 24 hours                              1  [  ] Age > 60                                                      1  IMPROVE VTE Score ____1_____

## 2025-06-09 NOTE — H&P ADULT - TIME BILLING
-bedside history and physical exam  -obtaining collateral history from spouse at bedside and daughter Ifeoma  -lab review  -thorough mental status exam  -counseling family on plan for the evening  -calling outside Neurologist to discuss case and plan of care  -clinical decision making  -documentation of encounter      Time spent excludes time spent teaching residents

## 2025-06-09 NOTE — H&P ADULT - PROBLEM SELECTOR PLAN 2
CADASIL is a rare, inherited type of vascular disease (a disease of the blood vessels such as arteries and veins) that can cause dementia. 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    followed by Dr. Marce Keith at [facility] -- cell phone number [phone]  recommends MR brain without contrast, and if negative, then LP  c/f area postrema stroke, increased ICP, or infection-associated encephalopathy disease progression    MR brain ordered and consent sent   c/w home escitalopram  Dr. Bains neuro consulted

## 2025-06-09 NOTE — H&P ADULT - NSHPREVIEWOFSYSTEMS_GEN_ALL_CORE
CONSTITUTIONAL:  No fevers or chills, good appetite, good general state  EYES/ENT:  No visual changes;  No vertigo or throat pain   NECK:  No neck pain or stiffness  RESPIRATORY:  No cough, wheezing, hemoptysis; No shortness of breath  CARDIOVASCULAR:  No chest pain or palpitations  GASTROINTESTINAL: +vomiting   GENITOURINARY:  No dysuria, frequency or hematuria  NEUROLOGICAL:  No HA, no numbness or LE weakness  MSK: no back pain, no joint pain  SKIN:  No itching, no skin rash

## 2025-06-09 NOTE — ED PROVIDER NOTE - PROGRESS NOTE DETAILS
[place]: spoke with Dr Bains and rec to obtain cta head and neck. ct perfusion to make sure no clot or sign of active cva. mri can be done non-emergent. [place]: [facility] neuro [phone] Dr baez rec to obtain cth and cta to make sure no bleed and to call back for update. family requesting transfer to [facility]. CT showing .No intracranial hemorrhage is appreciated.  No intracranial   mass lesion is appreciated. Multiple small remote deep infarctions within   the cerebral hemispheric white matter and basal ganglia. Cerebral   hemispheric white matter lesions likely ischemic white matter disease   greater than typical for age    2.  RIGHT CAROTID SYSTEM:    Atherosclerotic plaque carotid bulb without    hemodynamically significant stenosis.    3.   LEFT CAROTID SYSTEM:     Atherosclerotic plaque carotid bulb without    hemodynamically significant stenosis.    4.   VERTEBRAL CIRCULATION:    Patent.    Dr. Edwards updated of results.  Pt to be admitted.

## 2025-06-09 NOTE — H&P ADULT - ASSESSMENT
65 yr old male with hx of CADASIL (neuro genetic disorder, sees Dr. Marce Keith at [place]), HTN, BPH, DM, seizures on keppra 500mg BID who came to the ER for nausea/vomiting for one day and increased encephalopathy since morning of admission. 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 He does not take any medications for it at this time. Per wife at bedside, this morning he woke up more confused and forgot members of his family's names, wore swim trunks to breakfast, and thought it was the winter. No known sick contacts. No headache, fever, dizziness, flank pain, dysuria, skin infection, diarrhea, or respiratory symptoms. No photophobia or weakness. He is awake and alert and able to have a conversation, though inappropriately answers some questions.     He will be admitted for MRI ISO acute encephalopathy.

## 2025-06-09 NOTE — H&P ADULT - HISTORY OF PRESENT ILLNESS
65 yr old male with hx of CADASIL (neuro genetic disorder, sees Dr. Marce Keith at [facility]), HTN, BPH, DM, seizures on keppra 500mg BID who came to the ER for nausea/vomiting for one day and increased encephalopathy since morning of admission. 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 He does not take any medications for it at this time. Per wife at bedside, this morning he woke up more confused and forgot members of his family's names, wore swim trunks to breakfast, and thought it was the winter. No known sick contacts. No headache, fever, dizziness, flank pain, dysuria, skin infection, diarrhea, or respiratory symptoms. No photophobia or weakness. He is awake and alert and able to have a conversation, though inappropriately answers some questions.     He will be admitted for MRI ISO acute encephalopathy.     ED Course    Vitals: tmax 37.3; rest wnl  Labs: BUN/Cr 28/1.28 rest grossly wnl  Intervention: 1L IVF   Consults: neurology  Images: CTh wnl; MR brain pending

## 2025-06-09 NOTE — ED PROVIDER NOTE - CRITICAL CARE ATTENDING CONTRIBUTION TO CARE
[place]: Due to the presence of ams and the risk of sudden rapid deterioration due to my attendance to this patient required critical care time of approx 100 minutes including assessment/reassessment, documentation, ordering and interpreting ancillary studies, discussion with ED staff and consultants, patient and their family, and excludes time spent in teaching of Residents and time spent on separately billable procedures.

## 2025-06-09 NOTE — ED ADULT NURSE REASSESSMENT NOTE - NS ED NURSE REASSESS COMMENT FT1
NP Peter notified that family at bedside is requesting to discuss MRI results as well as orders for home medication reconciliation & maintenance fluids. NP will review chart & come to ED to speak to family.

## 2025-06-09 NOTE — ED ADULT NURSE NOTE - OBJECTIVE STATEMENT
Patient c/o AMS, daughter at bedside states patient woke up confused this AM and has gotten progressively worse throughout day. Denies chest pain/SOB, headache, difficulty ambulating, loss of balance, dizziness. x2 episode vomiting

## 2025-06-09 NOTE — H&P ADULT - NSHPPOADEEPVENOUSTHROMB_GEN_A_CORE
Chief Complaint  No chief complaint on file.    Subjective    Jose Antonio Do Jr. is a 65 y.o. male.     Jose Antonio Do Jr. presents to [facility] for       History of Present Illness    The following portions of the patient's history were reviewed and updated as appropriate: allergies, current medications, past family history, past medical history, past social history, past surgical history and problem list.    1 HTN-chronic and controlled.  Patient says that his blood pressures been well controlled tenosynovitis on medication.    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  Patient started on doxycycline and this medication did help with improving his infection of the skin.  Patient would like another round to see if this would help    In regards to pain in the contracture of his hands and feet patient has been taken Tylenol and NSAIDs and this is proven not relieving his pain patient would like something little bit stronger.    Review of Systems   All other systems reviewed and are negative.      Objective   Vital Signs:   /80   Temp 97.3 °F (36.3 °C) (Temporal)   Resp 20   Wt 77.7 kg (171 lb 4 oz)   BMI 23.23 kg/m²     Body mass index is 23.23 kg/m².    Physical Exam  Vitals and nursing note reviewed.   Constitutional:       Appearance: Normal appearance. He is normal weight.   HENT:      Head: Normocephalic and atraumatic.      Right Ear: Tympanic membrane, ear canal and external ear normal.      Left Ear: Tympanic membrane, ear canal and external ear normal.      Nose: Nose normal.      Mouth/Throat:      Mouth: Mucous membranes are moist.   Eyes:      Extraocular Movements: Extraocular movements intact.      Pupils: Pupils are equal, round, and reactive to light.   Cardiovascular:      Rate and Rhythm: Normal rate.      Pulses: Normal  pulses.   Pulmonary:      Effort: Pulmonary effort is normal.   Musculoskeletal:         General: Normal range of motion.      Cervical back: Normal range of motion and neck supple.   Skin:     General: Skin is warm.      Findings: Erythema, lesion and rash present.      Comments: Proved since the start of the doxycycline.   Neurological:      Mental Status: He is alert.               Assessment and Plan  Diagnoses and all orders for this visit:    Diagnoses and all orders for this visit:    1. Essential hypertension (Primary)-continue on current blood pressure medications.    2. Sezary's disease, unspecified body region (HCC)  -     doxycycline (ADOXA) 100 MG tablet; Take 1 tablet by mouth 2 (Two) Times a Day.  Dispense: 28 tablet; Refill: 0    -     HYDROcodone-acetaminophen (NORCO) 7.5-325 MG per tablet; Take 1 tablet by mouth Every 6 (Six) Hours As Needed for Moderate Pain .  Dispense: 30 tablet; Refill: 0             no

## 2025-06-09 NOTE — H&P ADULT - NSHPPHYSICALEXAM_GEN_ALL_CORE
Gen: AOx2, NAD, non-toxic, pleasant  HEAD:  Atraumatic, Normocephalic  EYES: PERRLA, conjunctiva and sclera clear  ENT: Moist mucous membranes  NECK: Supple, No JVD  CV: S1+S2 normal, no murmurs   Resp: Clear bilat, no resp distress, no crackles/wheezes  Abd: Soft, nontender, +BS  Ext: No LE edema, no cyanosis, LE pulses present  IV/Skin: +old appearing bruising on arms   Msk: No joint swelling  Neuro: AAOx2 with confusion, answering questions incorrectly; no focal deficits   Psych: no anxiety, no delusional ideas, no suicidal ideation

## 2025-06-10 LAB
A1C WITH ESTIMATED AVERAGE GLUCOSE RESULT: 6 % — HIGH (ref 4–5.6)
AMPHET UR-MCNC: NEGATIVE — SIGNIFICANT CHANGE UP
ANION GAP SERPL CALC-SCNC: 6 MMOL/L — SIGNIFICANT CHANGE UP (ref 5–17)
BARBITURATES UR SCN-MCNC: NEGATIVE — SIGNIFICANT CHANGE UP
BASOPHILS # BLD AUTO: 0.03 K/UL — SIGNIFICANT CHANGE UP (ref 0–0.2)
BASOPHILS NFR BLD AUTO: 0.4 % — SIGNIFICANT CHANGE UP (ref 0–2)
BENZODIAZ UR-MCNC: NEGATIVE — SIGNIFICANT CHANGE UP
BUN SERPL-MCNC: 26 MG/DL — HIGH (ref 7–18)
CALCIUM SERPL-MCNC: 9 MG/DL — SIGNIFICANT CHANGE UP (ref 8.4–10.5)
CHLORIDE SERPL-SCNC: 105 MMOL/L — SIGNIFICANT CHANGE UP (ref 96–108)
CO2 SERPL-SCNC: 27 MMOL/L — SIGNIFICANT CHANGE UP (ref 22–31)
COCAINE METAB.OTHER UR-MCNC: NEGATIVE — SIGNIFICANT CHANGE UP
CREAT SERPL-MCNC: 1.32 MG/DL — HIGH (ref 0.5–1.3)
EGFR: 59 ML/MIN/1.73M2 — LOW
EGFR: 59 ML/MIN/1.73M2 — LOW
EOSINOPHIL # BLD AUTO: 0.01 K/UL — SIGNIFICANT CHANGE UP (ref 0–0.5)
EOSINOPHIL NFR BLD AUTO: 0.1 % — SIGNIFICANT CHANGE UP (ref 0–6)
ESTIMATED AVERAGE GLUCOSE: 126 MG/DL — HIGH (ref 68–114)
GLUCOSE SERPL-MCNC: 115 MG/DL — HIGH (ref 70–99)
HCT VFR BLD CALC: 31.4 % — LOW (ref 39–50)
HGB BLD-MCNC: 10.6 G/DL — LOW (ref 13–17)
IMM GRANULOCYTES NFR BLD AUTO: 0.3 % — SIGNIFICANT CHANGE UP (ref 0–0.9)
LYMPHOCYTES # BLD AUTO: 1.44 K/UL — SIGNIFICANT CHANGE UP (ref 1–3.3)
LYMPHOCYTES # BLD AUTO: 21.2 % — SIGNIFICANT CHANGE UP (ref 13–44)
MCHC RBC-ENTMCNC: 29.9 PG — SIGNIFICANT CHANGE UP (ref 27–34)
MCHC RBC-ENTMCNC: 33.8 G/DL — SIGNIFICANT CHANGE UP (ref 32–36)
MCV RBC AUTO: 88.5 FL — SIGNIFICANT CHANGE UP (ref 80–100)
METHADONE UR-MCNC: NEGATIVE — SIGNIFICANT CHANGE UP
MONOCYTES # BLD AUTO: 0.49 K/UL — SIGNIFICANT CHANGE UP (ref 0–0.9)
MONOCYTES NFR BLD AUTO: 7.2 % — SIGNIFICANT CHANGE UP (ref 2–14)
NEUTROPHILS # BLD AUTO: 4.79 K/UL — SIGNIFICANT CHANGE UP (ref 1.8–7.4)
NEUTROPHILS NFR BLD AUTO: 70.8 % — SIGNIFICANT CHANGE UP (ref 43–77)
NRBC BLD AUTO-RTO: 0 /100 WBCS — SIGNIFICANT CHANGE UP (ref 0–0)
OPIATES UR-MCNC: NEGATIVE — SIGNIFICANT CHANGE UP
PCP SPEC-MCNC: SIGNIFICANT CHANGE UP
PCP UR-MCNC: NEGATIVE — SIGNIFICANT CHANGE UP
PLATELET # BLD AUTO: 206 K/UL — SIGNIFICANT CHANGE UP (ref 150–400)
POTASSIUM SERPL-MCNC: 3.9 MMOL/L — SIGNIFICANT CHANGE UP (ref 3.5–5.3)
POTASSIUM SERPL-SCNC: 3.9 MMOL/L — SIGNIFICANT CHANGE UP (ref 3.5–5.3)
RBC # BLD: 3.55 M/UL — LOW (ref 4.2–5.8)
RBC # FLD: 12.9 % — SIGNIFICANT CHANGE UP (ref 10.3–14.5)
SODIUM SERPL-SCNC: 138 MMOL/L — SIGNIFICANT CHANGE UP (ref 135–145)
THC UR QL: POSITIVE
WBC # BLD: 6.78 K/UL — SIGNIFICANT CHANGE UP (ref 3.8–10.5)
WBC # FLD AUTO: 6.78 K/UL — SIGNIFICANT CHANGE UP (ref 3.8–10.5)

## 2025-06-10 PROCEDURE — 99233 SBSQ HOSP IP/OBS HIGH 50: CPT

## 2025-06-10 PROCEDURE — 99233 SBSQ HOSP IP/OBS HIGH 50: CPT | Mod: FS

## 2025-06-10 RX ORDER — ACETAMINOPHEN 500 MG/5ML
1000 LIQUID (ML) ORAL ONCE
Refills: 0 | Status: COMPLETED | OUTPATIENT
Start: 2025-06-10 | End: 2025-06-10

## 2025-06-10 RX ORDER — ACETAMINOPHEN 500 MG/5ML
650 LIQUID (ML) ORAL ONCE
Refills: 0 | Status: COMPLETED | OUTPATIENT
Start: 2025-06-10 | End: 2025-06-10

## 2025-06-10 RX ADMIN — Medication 81 MILLIGRAM(S): at 11:23

## 2025-06-10 RX ADMIN — LEVETIRACETAM 500 MILLIGRAM(S): 10 INJECTION, SOLUTION INTRAVENOUS at 19:32

## 2025-06-10 RX ADMIN — FINASTERIDE 5 MILLIGRAM(S): 1 TABLET, FILM COATED ORAL at 11:29

## 2025-06-10 RX ADMIN — Medication 1000 MILLIGRAM(S): at 05:00

## 2025-06-10 RX ADMIN — FENOFIBRATE 145 MILLIGRAM(S): 160 TABLET ORAL at 12:09

## 2025-06-10 RX ADMIN — Medication 400 MILLIGRAM(S): at 04:02

## 2025-06-10 RX ADMIN — SODIUM CHLORIDE 1000 MILLILITER(S): 9 INJECTION, SOLUTION INTRAVENOUS at 00:00

## 2025-06-10 RX ADMIN — TAMSULOSIN HYDROCHLORIDE 0.4 MILLIGRAM(S): 0.4 CAPSULE ORAL at 22:10

## 2025-06-10 RX ADMIN — Medication 650 MILLIGRAM(S): at 22:39

## 2025-06-10 RX ADMIN — Medication 650 MILLIGRAM(S): at 22:09

## 2025-06-10 RX ADMIN — Medication 75 MILLILITER(S): at 05:49

## 2025-06-10 RX ADMIN — METOPROLOL SUCCINATE 25 MILLIGRAM(S): 50 TABLET, EXTENDED RELEASE ORAL at 05:45

## 2025-06-10 RX ADMIN — ROSUVASTATIN CALCIUM 40 MILLIGRAM(S): 20 TABLET, FILM COATED ORAL at 22:10

## 2025-06-10 RX ADMIN — Medication 3 MILLIGRAM(S): at 22:10

## 2025-06-10 RX ADMIN — TAMSULOSIN HYDROCHLORIDE 0.4 MILLIGRAM(S): 0.4 CAPSULE ORAL at 03:26

## 2025-06-10 RX ADMIN — EZETIMIBE 10 MILLIGRAM(S): 10 TABLET ORAL at 12:10

## 2025-06-10 RX ADMIN — ESCITALOPRAM OXALATE 10 MILLIGRAM(S): 20 TABLET ORAL at 12:10

## 2025-06-10 RX ADMIN — LEVETIRACETAM 500 MILLIGRAM(S): 10 INJECTION, SOLUTION INTRAVENOUS at 05:44

## 2025-06-10 RX ADMIN — LOSARTAN POTASSIUM 50 MILLIGRAM(S): 100 TABLET, FILM COATED ORAL at 05:45

## 2025-06-10 NOTE — CONSULT NOTE ADULT - NS ATTEND AMEND GEN_ALL_CORE FT
I have seen, examined, and discussed the patient for a total of 80 minutes. I agree with the history, examination, assessment, and plan, and I have amended the documentation where necessary. The patient acknowledges stopping Aspirin 81mg PO every other day for up to 6 months due to his history of easy bruising. Counseling included discussion with the patient, daughter and wife at bedside, and primary team about the differential diagnosis for the patient's change in mental status, including the possibility of nonconvulsive seizure or transient ischemic attack, and the testing required for further workup. I have seen, examined, and discussed the patient for a total of 80 minutes. I agree with the history, examination, assessment, and plan, and I have amended the documentation where necessary. The patient acknowledges stopping Aspirin 81mg PO every other day for up to 6 months due to his history of easy bruising. Counseling included discussion with the patient, daughter and wife at bedside, and primary team about the differential diagnosis for the patient's change in mental status, including the possibility of nonconvulsive seizure vs. transient ischemic attack vs. sequelae of toxic/metabolic encephalopathy in the setting of acute kidney injury and recent emesis, and the testing required for further workup.

## 2025-06-10 NOTE — PATIENT PROFILE ADULT - FALL HARM RISK - HARM RISK INTERVENTIONS
Assistance with ambulation/Assistance OOB with selected safe patient handling equipment/Communicate Risk of Fall with Harm to all staff/Monitor for mental status changes/Monitor gait and stability/Provide patient with walking aids - walker, cane, crutches/Reinforce activity limits and safety measures with patient and family/Reorient to person, place and time as needed/Review medications for side effects contributing to fall risk/Sit up slowly, dangle for a short time, stand at bedside before walking/Tailored Fall Risk Interventions/Toileting schedule using arm’s reach rule for commode and bathroom/Use of alarms - bed, chair and/or voice tab/Visual Cue: Yellow wristband and red socks/Bed in lowest position, wheels locked, appropriate side rails in place/Call bell, personal items and telephone in reach/Instruct patient to call for assistance before getting out of bed or chair/Non-slip footwear when patient is out of bed/[place] to call system/Physically safe environment - no spills, clutter or unnecessary equipment/Purposeful Proactive Rounding/Room/bathroom lighting operational, light cord in reach

## 2025-06-10 NOTE — CONSULT NOTE ADULT - SUBJECTIVE AND OBJECTIVE BOX
NEUROLOGY CONSULT NOTE    HPI: 65y R hand dominant Male with PMHx of CADASIL Dxed in 2024, Seizure ( since 2024, 3 days post Dx of CADASIL), F/U with Dr. Marce Keith ([facility]), HTN, DM, BPH was BIB family for N/V X 1 day and AMS since morning of admission. Per pt and family, he was noted to have some cognitive impairment in 2023 mostly forgetfulness, for which he followed up with neurologist, subsequently had MRI and w/u, was told his MRI is suspicious of CADASIL, was sent to F/U with Dr. Zheng @ [facility]. Genetic testing done to confirm the Dx. Also states pts father had strokes/TIA in his 60's      65 yr old male with hx of CADASIL (neuro genetic disorder, sees Dr. Marce Keith at [place]), HTN, BPH, DM, seizures on keppra 500mg BID who came to the ER for nausea/vomiting for one day and increased encephalopathy since morning of admission. 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 He does not take any medications for it at this time. Per wife at bedside, this morning he woke up more confused and forgot members of his family's names, wore swim trunks to breakfast, and thought it was the winter. No known sick contacts. No headache, fever, dizziness, flank pain, dysuria, skin infection, diarrhea, or respiratory symptoms. No photophobia or weakness. He is awake and alert and able to have a conversation, though inappropriately answers some questions.     T(C): 36.7 (06-10-25 @ 13:18), Max: 38.4 (06-10-25 @ 03:32)  HR: 58 (06-10-25 @ 13:18) (58 - 74)  BP: 111/63 (06-10-25 @ 13:18) (102/66 - 117/67)  RR: 18 (06-10-25 @ 13:18) (18 - 18)  SpO2: 96% (06-10-25 @ 13:18) (94% - 99%)    PAST MEDICAL & SURGICAL HISTORY:  CADASIL  Seizure      FAMILY HISTORY:  Father w/Strokes/TIA in his 60s      SOCIAL HISTORY:   Patient lives with *** at ***.   Smoking status:  Drinking:  Drug Use:     ROS: ***  Constitutional: No fever, weight loss or fatigue  Eyes: No eye pain, visual disturbances, or discharge  ENMT:  No difficulty hearing, tinnitus; No sinus or throat pain  Neck: No pain or stiffness  Respiratory: No cough, wheezing, chills or hemoptysis  Cardiovascular: No chest pain, palpitations, shortness of breath, or leg swelling  Gastrointestinal: No abdominal pain. No nausea, vomiting or hematemesis; No diarrhea or constipation. Nohematochezia.  Genitourinary: No dysuria, frequency, hematuria or incontinence  Neurological: As per HPI  Skin: No itching, burning, rashes or lesions   Endocrine: No heat or cold intolerance; No hair loss  Musculoskeletal: No joint pain or swelling; No muscle, back or extremity pain  Heme/Lymph: No easy bruising or bleeding gums    MEDICATIONS  (STANDING):  aspirin enteric coated 81 milliGRAM(s) Oral daily  escitalopram 10 milliGRAM(s) Oral daily  ezetimibe 10 milliGRAM(s) Oral daily  fenofibrate Tablet 145 milliGRAM(s) Oral daily  finasteride 5 milliGRAM(s) Oral daily  hydrochlorothiazide 12.5 milliGRAM(s) Oral daily  levETIRAcetam 500 milliGRAM(s) Oral two times a day  losartan 50 milliGRAM(s) Oral daily  metoprolol succinate ER 25 milliGRAM(s) Oral daily  rosuvastatin 40 milliGRAM(s) Oral at bedtime  sodium chloride 0.9%. 1000 milliLiter(s) (75 mL/Hr) IV Continuous <Continuous>  tamsulosin 0.4 milliGRAM(s) Oral at bedtime    MEDICATIONS  (PRN):  aluminum hydroxide/magnesium hydroxide/simethicone Suspension 30 milliLiter(s) Oral every 4 hours PRN Dyspepsia  melatonin 3 milliGRAM(s) Oral at bedtime PRN Insomnia  ondansetron Injectable 4 milliGRAM(s) IV Push every 8 hours PRN Nausea and/or Vomiting    Allergies    No Known Allergies    Intolerances      Vital Signs Last 24 Hrs  T(C): 36.7 (10 Delio 2025 13:18), Max: 38.4 (10 Delio 2025 03:32)  T(F): 98.1 (10 Delio 2025 13:18), Max: 101.1 (10 Delio 2025 03:32)  HR: 58 (10 Delio 2025 13:18) (58 - 74)  BP: 111/63 (10 Delio 2025 13:18) (102/66 - 117/67)  BP(mean): 83 (10 Delio 2025 05:41) (78 - 83)  RR: 18 (10 Delio 2025 13:18) (18 - 18)  SpO2: 96% (10 Delio 2025 13:18) (94% - 99%)    Parameters below as of 10 Delio 2025 13:18  Patient On (Oxygen Delivery Method): room air        Physical exam:  Constitutional: No acute distress, conversant  Eyes: Anicteric sclerae, moist conjunctivae, see below for CNs  Neck: trachea midline, FROM, supple, no thyromegaly or lymphadenopathy  Cardiovascular: Regular rate and rhythm, no murmurs, rubs, or gallops. No carotid bruits.   Pulmonary: Anterior breath sounds clear bilaterally, no crackles or wheezing. No use of accessory muscles  GI: Abdomen soft, non-distended, non-tender  Extremities: Radial and DP pulses +2, no edema    Neurologic:  -Mental status: Awake, alert, oriented to person, place, and time. Speech is fluent with intact naming, repetition, and comprehension, no dysarthria. Recent and remote memory intact. Follows commands. Attention/concentration intact. Fund of knowledge appropriate.  -Cranial nerves:   II: Visual fields are full to confrontation.  III, IV, VI: Extraocular movements are intact without nystagmus. Pupils equally round and reactive to light  V:  Facial sensation V1-V3 equal and intact   VII: Face is symmetric with normal eye closure and smile  VIII: Hearing is bilaterally intact to finger rub  IX, X: Uvula is midline and soft palate rises symmetrically  XI: Head turning and shoulder shrug are intact.  XII: Tongue protrudes midline  Motor: Normal bulk and tone. No pronator drift. Strength bilateral upper extremity 5/5, bilateral lower extremities 5/5.  Rapid alternating movements intact and symmetric  Sensation: Intact to light touch bilaterally. No neglect or extinction on double simultaneous testing.  Coordination: No dysmetria on finger-to-nose and heel-to-shin bilaterally  Reflexes: Downgoing toes bilaterally   Gait: Narrow gait and steady    NIHSS: **** ASPECT Score: ***** ICH Score: ****** (GCS)    Fingerstick Blood Glucose: CAPILLARY BLOOD GLUCOSE        LABS:                        10.6   6.78  )-----------( 206      ( 10 Delio 2025 05:23 )             31.4     06-10    138  |  105  |  26[H]  ----------------------------<  115[H]  3.9   |  27  |  1.32[H]    Ca    9.0      10 Delio 2025 05:23    TPro  7.5  /  Alb  4.0  /  TBili  0.4  /  DBili  x   /  AST  21  /  ALT  25  /  AlkPhos  37[L]  06-09          Urinalysis Basic - ( 10 Delio 2025 05:23 )    Color: x / Appearance: x / SG: x / pH: x  Gluc: 115 mg/dL / Ketone: x  / Bili: x / Urobili: x   Blood: x / Protein: x / Nitrite: x   Leuk Esterase: x / RBC: x / WBC x   Sq Epi: x / Non Sq Epi: x / Bacteria: x        RADIOLOGY & ADDITIONAL STUDIES:      -----------------------------------------------------------------------------------------------------------------  IV-tPA (Y/N):    ***                              Bolus time:    Alteplase Dose Verification w/ RN:  Reason IV-tPA not given: ***   NEUROLOGY CONSULT NOTE    HPI: 65y R hand dominant Male with PMHx of CADASIL Dxed in , Seizure ( since , 3 days post Dx of CADASIL), F/U with Dr. Marce Keith ([facility]), HTN, DM, BPH was BIB family for N/V X 1 day and AMS since morning of admission. Per HPI, pt woke up on day of admission more confused, forgot family members names, wore swim trunks to breakfast and thought it was winter. During family discussion, pts daughter stated that he forgot his grandkids name and appeared bit more confused on  (25).  Pt with no recall of the events that brought him to the hospital. Initial Non con CTH w/no acute intracranial pathology, multiple remote deep infarctions within cerebral WM and BG greater than typical for age, CTA H/N mild to moderate athero of B/L cavernous and clinoid segments of ICA, mild R vert athero and CTP w/no core infarct, WM ischemia w/low flow/watershed distribution. Pt not a candidate for any acute stroke intervention, no TNK 2/2 out of the time window and Hx of CADASIL, no EVT 2/2 no LVO.       Per pt and family, he was noted to have some cognitive impairment in  initially some forgetfulness which was progressing, for which he followed up with neurologist, subsequently had w/u including MRI, was told his MRI is suspicious of CADASIL, was sent to F/U with Dr. Marce Keith @ [facility]. Genetic testing done to confirm the Dx. Also states pts father had strokes/TIA in his 60's with some cognitive impairment? Pt and family denies any prior Hx of strokes or Migraine HAs. Pt appears to be back to his baseline per family during my encounter today. Also states he was on ASA 81mg daily which was reduced to ASA 81mg every other day (3days a week) for easy bruising by his neurologist which he stopped taking about 6months ago?, but family thinks he stopped it for may be about a month.         T(C): 36.7 (06-10-25 @ 13:18), Max: 38.4 (06-10-25 @ 03:32)  HR: 58 (06-10-25 @ 13:18) (58 - 74)  BP: 111/63 (06-10-25 @ 13:18) (102/66 - 117/67)  RR: 18 (06-10-25 @ 13:18) (18 - 18)  SpO2: 96% (06-10-25 @ 13:18) (94% - 99%)    PAST MEDICAL & SURGICAL HISTORY:  CADASIL  Seizure      FAMILY HISTORY:  Father w/Strokes/TIA in his 60s      SOCIAL HISTORY:   Patient lives with family.       ROS:   Constitutional: No fever, weight loss or fatigue  Eyes: No eye pain, visual disturbances, or discharge  ENMT:  No difficulty hearing, tinnitus; No sinus or throat pain  Neck: No pain or stiffness  Respiratory: No cough, wheezing, chills or hemoptysis  Cardiovascular: No chest pain, palpitations, shortness of breath, or leg swelling  Gastrointestinal: No abdominal pain. No nausea, vomiting.  Neurological: As per HPI      MEDICATIONS  (STANDING):  aspirin enteric coated 81 milliGRAM(s) Oral daily  escitalopram 10 milliGRAM(s) Oral daily  ezetimibe 10 milliGRAM(s) Oral daily  fenofibrate Tablet 145 milliGRAM(s) Oral daily  finasteride 5 milliGRAM(s) Oral daily  hydrochlorothiazide 12.5 milliGRAM(s) Oral daily  levETIRAcetam 500 milliGRAM(s) Oral two times a day  losartan 50 milliGRAM(s) Oral daily  metoprolol succinate ER 25 milliGRAM(s) Oral daily  rosuvastatin 40 milliGRAM(s) Oral at bedtime  sodium chloride 0.9%. 1000 milliLiter(s) (75 mL/Hr) IV Continuous <Continuous>  tamsulosin 0.4 milliGRAM(s) Oral at bedtime    MEDICATIONS  (PRN):  aluminum hydroxide/magnesium hydroxide/simethicone Suspension 30 milliLiter(s) Oral every 4 hours PRN Dyspepsia  melatonin 3 milliGRAM(s) Oral at bedtime PRN Insomnia  ondansetron Injectable 4 milliGRAM(s) IV Push every 8 hours PRN Nausea and/or Vomiting    Allergies  No Known Allergies      Vital Signs Last 24 Hrs  T(C): 36.7 (10 Delio 2025 13:18), Max: 38.4 (10 Delio 2025 03:32)  T(F): 98.1 (10 Delio 2025 13:18), Max: 101.1 (10 Delio 2025 03:32)  HR: 58 (10 Delio 2025 13:18) (58 - 74)  BP: 111/63 (10 Delio 2025 13:18) (102/66 - 117/67)  BP(mean): 83 (10 Delio 2025 05:41) (78 - 83)  RR: 18 (10 Delio 2025 13:18) (18 - 18)  SpO2: 96% (10 Delio 2025 13:18) (94% - 99%)    Parameters below as of 10 Delio 2025 13:18  Patient On (Oxygen Delivery Method): room air        Physical exam:  Constitutional: No acute distress, conversant  Eyes: Anicteric sclerae, moist conjunctivae, see below for CNs  Neck: trachea midline, FROM, supple.   Cardiovascular: Regular rate and rhythm.   Pulmonary: Respiration appears to be even and non labored.       Neurologic:  -Mental status: Awake, alert, oriented to person ( able to state his name, , recognise and name family members @ bedside, not able to state his age) , place (as hospital), partially oriented to time (not able to state the month, year as , season as summer) and oriented to situation. Speech is fluent with intact naming and function, repetition, and comprehension, no dysarthria. Follows simple and crossed commands. Attention/concentration intact. Fund of knowledge appropriate.   Immediate recall intact.  Delayed Recall of 3 objects: able to recall 2 objects on first attempt, and able to recall 3rd word Table w/categorical clue.     -Cranial nerves:   II: Visual fields are full to confrontation by finger counting.   III, IV, VI: Extraocular movements are intact without nystagmus. Pupils equally round and reactive to light.  V:  Facial sensation V1-V3 equal and intact   VII: Face appears w/subtle asymmetry ? R NLFF of unclear chronicity and significance.   VIII: Hearing is bilaterally intact grossly.   IX, X: Uvula is midline and soft palate rises symmetrically  XI: Head turning and shoulder shrug are intact.  XII: Tongue protrudes midline  Motor: Normal bulk and tone. No pronator drift. Strength bilateral upper extremity 5/5, LUE w/ shoulder surgery,  bilateral lower extremities 5/5.  Rapid alternating movements intact and symmetric  Sensation: Intact to light touch bilaterally. No neglect or extinction on double simultaneous testing.  Coordination: No dysmetria on finger-to-nose.   Gait: Deferred.     NIHSS: 1( R NLFF of unclear chronicity and significance).   Pre mRS : 1        LABS:                        10.6   6.78  )-----------( 206      ( 10 Delio 2025 05:23 )             31.4     06-10    138  |  105  |  26[H]  ----------------------------<  115[H]  3.9   |  27  |  1.32[H]    Ca    9.0      10 Delio 2025 05:23    TPro  7.5  /  Alb  4.0  /  TBili  0.4  /  DBili  x   /  AST  21  /  ALT  25  /  AlkPhos  37[L]        THC, Urine Qualitative: Positive (06.10.25 @ 00:29)    A1C with Estimated Average Glucose Result: 6.0          RADIOLOGY & ADDITIONAL STUDIES:    MR Head No Cont (25 @ 21:17)     IMPRESSION: Imaging findings consistent with the patient's provided   diagnosis of CADASIL. Please see full discussion in the body the report.    FINDINGS: Multiple extensive patchy confluent nonspecific T2/FLAIR   hyperintense signal changes are noted throughout the bihemispheric white   matter without associated mass effect or restricted diffusion. There is   also involvement of the bilateral anterior temporal lobe subcortical   white matter and curvilinear hyperintense T2/FLAIR hyperintense signal   changes within the bilateral external capsules which is fairly specific   for CADASIL. A few small scattered foci of susceptibility artifact are   also seen within the right temporal lobes, right paramedian occipital   lobe, and right basal ganglia.    Multiple additional chronic lacunar infarcts are seen within the   bihemispheric white matter, cerebellar hemispheres, bilateral basal   ganglia, right side worse than left and also within the bilateral thalami.    There is no acute intrarenal hemorrhage. There is no evidence of acute   ischemia on diffusion-weighted imaging.      CT Head No Cont (25 @ 16:35)   IMPRESSION:    1. BRAIN:  No intracranial hemorrhage is appreciated.  No intracranial   mass lesion is appreciated. Multiple small remote deep infarctions within   the cerebral hemispheric white matter and basal ganglia. Cerebral   hemispheric white matter lesions likely ischemic white matter disease   greater than typical for age    2.  RIGHT CAROTID SYSTEM:    Atherosclerotic plaque carotid bulb without    hemodynamically significant stenosis.    3.   LEFT CAROTID SYSTEM:     Atherosclerotic plaque carotid bulb without    hemodynamically significant stenosis.    4.   VERTEBRAL CIRCULATION:    Patent.    5.  ANTERIOR INTRACRANIAL CIRCULATION:     Intracranial atherosclerotic   process cavernous and clinoid segments of the internal carotid arteries,   mild-to-moderate.    6.  POSTERIOR INTRACRANIAL CIRCULATION:  Intracranial atherosclerosis   right vertebral artery, mild.    7.  No large vessel occlusion. Late arterial phase acquisition somewhat   limits evaluation of small caliber intracranial vasculature.    8.  BRAIN PERFUSION:   No acute infarction of the brain is convincingly   demonstrated.  Cerebral hemispheric white matter ischemia with   low-flow/watershed distribution. Consider MR evaluation      -----------------------------------------------------------------------------------------------------------------  IV-TNK (Y/N):   No                         Reason IV-TNK not given: Out of the time window, Hx of CADASIL.    NEUROLOGY CONSULT NOTE    HPI: 65y R hand dominant Male with PMHx of CADASIL Dxed in , Seizure ( since , 3 days post Dx of CADASIL), F/U with Dr. Marce Keith ([facility]), HTN, DM, BPH was BIB family for N/V X 1 day and AMS since morning of admission. Per HPI, pt woke up on day of admission more confused, forgot family members names, wore swim trunks to breakfast and thought it was winter. During family discussion, pts daughter stated that he forgot his grandkids name and appeared bit more confused on  (25).  Pt with no recall of the events that brought him to the hospital. Initial Non con CTH w/no acute intracranial pathology, multiple remote deep infarctions within cerebral WM and BG greater than typical for age, CTA H/N mild to moderate athero of B/L cavernous and clinoid segments of ICA, mild R vert athero and CTP w/no core infarct, WM ischemia w/low flow/watershed distribution. Pt not a candidate for any acute stroke intervention, no TNK 2/2 out of the time window and Hx of CADASIL, no EVT 2/2 no LVO.       Per pt and family, he was noted to have some cognitive impairment in  initially some forgetfulness which was progressing, for which he followed up with neurologist, subsequently had w/u including MRI, was told his MRI is suspicious of CADASIL, was sent to F/U with Dr. Marce Keith @ [facility]. Genetic testing done to confirm the Dx. Also states pts father had strokes/TIA in his 60's with some cognitive impairment? Pt and family denies any prior Hx of strokes or Migraine HAs. Pt appears to be back to his baseline per family during my encounter today. Also states he was on ASA 81mg daily which was reduced to ASA 81mg every other day (3days a week) for easy bruising by his neurologist which he stopped taking about 6months ago?, but family thinks he stopped it for may be about a month.         T(C): 36.7 (06-10-25 @ 13:18), Max: 38.4 (06-10-25 @ 03:32)  HR: 58 (06-10-25 @ 13:18) (58 - 74)  BP: 111/63 (06-10-25 @ 13:18) (102/66 - 117/67)  RR: 18 (06-10-25 @ 13:18) (18 - 18)  SpO2: 96% (06-10-25 @ 13:18) (94% - 99%)    PAST MEDICAL & SURGICAL HISTORY:  CADASIL  Seizure      FAMILY HISTORY:  Father w/Strokes/TIA in his 60s      SOCIAL HISTORY:   Patient lives with family.       ROS:   Constitutional: No fever, weight loss or fatigue  Eyes: No eye pain, visual disturbances, or discharge  ENMT:  No difficulty hearing, tinnitus; No sinus or throat pain  Neck: No pain or stiffness  Respiratory: No cough, wheezing, chills or hemoptysis  Cardiovascular: No chest pain, palpitations, shortness of breath, or leg swelling  Gastrointestinal: No abdominal pain. No nausea, vomiting.  Neurological: As per HPI      MEDICATIONS  (STANDING):  aspirin enteric coated 81 milliGRAM(s) Oral daily  escitalopram 10 milliGRAM(s) Oral daily  ezetimibe 10 milliGRAM(s) Oral daily  fenofibrate Tablet 145 milliGRAM(s) Oral daily  finasteride 5 milliGRAM(s) Oral daily  hydrochlorothiazide 12.5 milliGRAM(s) Oral daily  levETIRAcetam 500 milliGRAM(s) Oral two times a day  losartan 50 milliGRAM(s) Oral daily  metoprolol succinate ER 25 milliGRAM(s) Oral daily  rosuvastatin 40 milliGRAM(s) Oral at bedtime  sodium chloride 0.9%. 1000 milliLiter(s) (75 mL/Hr) IV Continuous <Continuous>  tamsulosin 0.4 milliGRAM(s) Oral at bedtime    MEDICATIONS  (PRN):  aluminum hydroxide/magnesium hydroxide/simethicone Suspension 30 milliLiter(s) Oral every 4 hours PRN Dyspepsia  melatonin 3 milliGRAM(s) Oral at bedtime PRN Insomnia  ondansetron Injectable 4 milliGRAM(s) IV Push every 8 hours PRN Nausea and/or Vomiting    Allergies  No Known Allergies      Vital Signs Last 24 Hrs  T(C): 36.7 (10 Delio 2025 13:18), Max: 38.4 (10 Delio 2025 03:32)  T(F): 98.1 (10 Delio 2025 13:18), Max: 101.1 (10 Delio 2025 03:32)  HR: 58 (10 Delio 2025 13:18) (58 - 74)  BP: 111/63 (10 Delio 2025 13:18) (102/66 - 117/67)  BP(mean): 83 (10 Delio 2025 05:41) (78 - 83)  RR: 18 (10 Delio 2025 13:18) (18 - 18)  SpO2: 96% (10 Delio 2025 13:18) (94% - 99%)  Parameters below as of 10 Delio 2025 13:18  Patient On (Oxygen Delivery Method): room air      Physical exam:  Constitutional: No acute distress, conversant  Eyes: Anicteric sclerae, moist conjunctivae, see below for CNs  Neck: trachea midline, FROM, supple.   Cardiovascular: Regular rate and rhythm.   Pulmonary: Respiration appears to be even and non labored.       Neurologic:  -Mental status: Awake, alert, oriented to person ( able to state his name, , recognise and name family members @ bedside, not able to state his age) , place (as hospital), partially oriented to time (not able to state the month, year as , season as summer) and oriented to situation. Speech is fluent with intact naming and function, repetition, and comprehension, no dysarthria. Follows simple and crossed commands. Attention/concentration intact. Fund of knowledge appropriate.   Immediate recall intact.  Delayed Recall of 3 objects: able to recall 2 objects on first attempt, and able to recall 3rd word Table w/categorical clue.     -Cranial nerves:   II: Visual fields are full to confrontation by finger counting.   III, IV, VI: Extraocular movements are intact without nystagmus. Pupils equally round and reactive to light.  V:  Facial sensation V1-V3 equal and intact   VII: Face appears w/subtle asymmetry ? R NLFF of unclear chronicity and significance.   VIII: Hearing is bilaterally intact grossly.   IX, X: Uvula is midline and soft palate rises symmetrically  XI: Head turning and shoulder shrug are intact.  XII: Tongue protrudes midline  Motor: Normal bulk and tone. No pronator drift. Strength bilateral upper extremity 5/5, LUE w/ shoulder surgery,  bilateral lower extremities 5/5.  Rapid alternating movements intact and symmetric  Sensation: Intact to light touch bilaterally. No neglect or extinction on double simultaneous testing.  Coordination: No dysmetria on finger-to-nose.   Gait: Deferred.     NIHSS: 1( R NLFF of unclear chronicity and significance).   Pre mRS : 1          LABS:                        10.6   6.78  )-----------( 206      ( 10 Delio 2025 05:23 )             31.4     06-10    138  |  105  |  26[H]  ----------------------------<  115[H]  3.9   |  27  |  1.32[H]    Ca    9.0      10 Delio 2025 05:23    TPro  7.5  /  Alb  4.0  /  TBili  0.4  /  DBili  x   /  AST  21  /  ALT  25  /  AlkPhos  37[L]      THC, Urine Qualitative: Positive (06.10.25 @ 00:29)    A1C with Estimated Average Glucose Result: 6.0          RADIOLOGY & ADDITIONAL STUDIES:    MR Head No Cont (25 @ 21:17)     IMPRESSION: Imaging findings consistent with the patient's provided   diagnosis of CADASIL. Please see full discussion in the body the report.    FINDINGS: Multiple extensive patchy confluent nonspecific T2/FLAIR   hyperintense signal changes are noted throughout the bihemispheric white   matter without associated mass effect or restricted diffusion. There is   also involvement of the bilateral anterior temporal lobe subcortical   white matter and curvilinear hyperintense T2/FLAIR hyperintense signal   changes within the bilateral external capsules which is fairly specific   for CADASIL. A few small scattered foci of susceptibility artifact are   also seen within the right temporal lobes, right paramedian occipital   lobe, and right basal ganglia.    Multiple additional chronic lacunar infarcts are seen within the   bihemispheric white matter, cerebellar hemispheres, bilateral basal   ganglia, right side worse than left and also within the bilateral thalami.    There is no acute intrarenal hemorrhage. There is no evidence of acute   ischemia on diffusion-weighted imaging.      CT Head No Cont (25 @ 16:35)   IMPRESSION:    1. BRAIN:  No intracranial hemorrhage is appreciated.  No intracranial   mass lesion is appreciated. Multiple small remote deep infarctions within   the cerebral hemispheric white matter and basal ganglia. Cerebral   hemispheric white matter lesions likely ischemic white matter disease   greater than typical for age    2.  RIGHT CAROTID SYSTEM:    Atherosclerotic plaque carotid bulb without    hemodynamically significant stenosis.    3.   LEFT CAROTID SYSTEM:     Atherosclerotic plaque carotid bulb without    hemodynamically significant stenosis.    4.   VERTEBRAL CIRCULATION:    Patent.    5.  ANTERIOR INTRACRANIAL CIRCULATION:     Intracranial atherosclerotic   process cavernous and clinoid segments of the internal carotid arteries,   mild-to-moderate.    6.  POSTERIOR INTRACRANIAL CIRCULATION:  Intracranial atherosclerosis   right vertebral artery, mild.    7.  No large vessel occlusion. Late arterial phase acquisition somewhat   limits evaluation of small caliber intracranial vasculature.    8.  BRAIN PERFUSION:   No acute infarction of the brain is convincingly   demonstrated.  Cerebral hemispheric white matter ischemia with   low-flow/watershed distribution. Consider MR evaluation      -----------------------------------------------------------------------------------------------------------------  IV-TNK (Y/N):   No                         Reason IV-TNK not given: Out of the time window, Hx of CADASIL.

## 2025-06-10 NOTE — PROGRESS NOTE ADULT - TIME BILLING
Discussion with patient, thorough neurological exam, review of outpatient records, reaching outpatient neurologist, review of inpatient labs, discussion with consultants, formulation of plan, medical and medication management, documentation of encounter

## 2025-06-10 NOTE — CONSULT NOTE ADULT - ASSESSMENT
Assessment and Plan :     Assessment : 65, R hand dominant male, w/ PMH of Seizures and CADASIL Dxed in 3/2024( had genetic testing), HTN, DM, BPH, BIB family for AMS on day of admission which is worse than his baseline confusion. Initial non con CTH w/no acute intracranial pathology, CTA H/N w/no LVO/HGS, some scattered athero in anterior and posterior circulation and CTP w/no definitive core. Pt admitted to Tele for further w/u. Neuro consulted for the same. MRI Brain done and is C/W provided diagnosis of CADASIL and no acute intracranial pathology.       Impression :  Pts presentation is C/F possible TIA ISO CADASIL and recent reported non compliance w/ASA Vs Seizure.       Reccs:     1)Secondary stroke prevention  - C/W his Home regimen ASA 81mg PO   - Can C/W Home Crestor 40mg PO QHS.   - C/W Home Keppra 500mg BID    2) Stroke risk factors  - A1C: 6.0  - LDL: Pend  - CADASIL  - HTN    3) Further management  - Please obtain Reeg to R/O Subclinical Seizures.   - MRI brain without : Consistent with CADASIL.   - recommend SBP goal <180, Gradual Normotension.   - recommend q4hr stroke neuro checks  - may need outpt neurology follow up (can F/U with his Outpt Neurologist Dr. Marce Keith).   - provide stroke education    DVT prophylaxis   - SCDs        Other:   - Telemetry monitoring; Neurochecks/VS per unit protocol  - Seizure, fall and aspiration precautions  - Please note: if patient has a convulsion, please document accurately the time of onset, any derangement of vital signs, length of episode, and duration of postictal period.   --> Please describe what occurred and  specifically what the patient was doing, paying attention to progression of limb involvement (upper/lower; R/L) if any, eye opening vs closure, head turn, gaze deviation, shaking of extremities, tongue bite, urinary/bowel incontinence.  - Given concern for seizure, advise patient to not drive, operate heavy machinery, avoid heights, pools, bathtubs, locked doors until cleared by further follow up outpatient by neurology. Risk of death associated with seizures / SUDEP.    Informed primary team KARL Luu.     Pt seen, eval and Discussed with Neurology Attending Dr. Bains.  Assessment and Plan :     Assessment : 65, R hand dominant male, w/ PMH of Seizures and CADASIL Dxed in 3/2024( had genetic testing), HTN, DM, BPH, BIB family for AMS on day of admission which is worse than his baseline confusion. Initial non con CTH w/no acute intracranial pathology, CTA H/N w/no LVO/HGS, some scattered athero in anterior and posterior circulation and CTP w/no definitive core. Pt admitted to Tele for further w/u. Neuro consulted for the same. MRI Brain done and is C/W provided diagnosis of CADASIL and no acute intracranial pathology.       Impression :  Pts presentation is C/F possible TIA ISO CADASIL and recent reported non compliance w/ASA Vs Seizure.       Reccs:     1)Secondary stroke prevention  - C/W his Home regimen ASA 81mg PO Daily while inpatient --> Every other day upon discharge (due to history of easy bruising and bleeding on ASA 81mg PO Daily)  - Can C/W Home Crestor 40mg PO QHS.   - C/W Home Keppra 500mg BID    2) Stroke risk factors  - A1C: 6.0  - LDL: Pend  - CADASIL  - HTN    3) Further management  - Please obtain Reeg to R/O Subclinical Seizures.   - MRI brain without : Consistent with CADASIL with chronic microvascular changes and multiple chronic lacunar infarcts, no acute infarct or bleed present  - recommend SBP goal <180, Gradual Normotension.   - recommend q4hr stroke neuro checks  - may need outpt neurology follow up (can F/U with his Outpt Neurologist Dr. Marce Keith).   - provide stroke education    DVT prophylaxis   - SCDs        Other:   - Telemetry monitoring; Neurochecks/VS per unit protocol  - Seizure, fall and aspiration precautions  - Please note: if patient has a convulsion, please document accurately the time of onset, any derangement of vital signs, length of episode, and duration of postictal period.   --> Please describe what occurred and  specifically what the patient was doing, paying attention to progression of limb involvement (upper/lower; R/L) if any, eye opening vs closure, head turn, gaze deviation, shaking of extremities, tongue bite, urinary/bowel incontinence.  - Given concern for seizure, advise patient to not drive, operate heavy machinery, avoid heights, pools, bathtubs, locked doors until cleared by further follow up outpatient by neurology. Risk of death associated with seizures / SUDEP.    Informed primary team KARL Luu.     Pt seen, eval and Discussed with Neurology Attending Dr. Bains.  Assessment and Plan :     Assessment : 65, R hand dominant male, w/ PMH of Seizures and CADASIL Dxed in 3/2024( had genetic testing), HTN, DM, BPH, BIB family for AMS on day of admission which is worse than his baseline confusion. Initial non con CTH w/no acute intracranial pathology, CTA H/N w/no LVO/HGS, some scattered athero in anterior and posterior circulation and CTP w/no definitive core. Pt admitted to Tele for further w/u. Neuro consulted for the same. MRI Brain done and is C/W provided diagnosis of CADASIL and no acute intracranial pathology.       Impression :  Pts presentation is C/F possible TIA ISO CADASIL and recent reported non compliance w/ASA Vs Seizure.       Reccs:     1)Secondary stroke prevention & Seizure prophylaxis  - C/W his Home regimen ASA 81mg PO Daily while inpatient --> Every other day upon discharge (due to history of easy bruising and bleeding on ASA 81mg PO Daily)  - Can C/W Home Rosuvastatin 40mg PO QHS.   - C/W Home Levetiracetam 500mg BID    2) Stroke risk factors  - A1C: 6.0  - LDL: Pend  - CADASIL  - HTN    3) Further management  - Please obtain Reeg to R/O Subclinical Seizures.   - MRI brain without : Consistent with CADASIL with chronic microvascular changes and multiple chronic lacunar infarcts, no acute infarct or bleed present  - recommend SBP goal <180, Gradual Normotension.   - recommend q4hr stroke neuro checks  - may need outpt neurology follow up (can F/U with his Outpt Neurologist Dr. Marce Keith).   - provide stroke education    DVT prophylaxis   - SCDs        Other:   - Telemetry monitoring; Neurochecks/VS per unit protocol  - Seizure, fall and aspiration precautions  - Please note: if patient has a convulsion, please document accurately the time of onset, any derangement of vital signs, length of episode, and duration of postictal period.   --> Please describe what occurred and  specifically what the patient was doing, paying attention to progression of limb involvement (upper/lower; R/L) if any, eye opening vs closure, head turn, gaze deviation, shaking of extremities, tongue bite, urinary/bowel incontinence.  - Given concern for seizure, advise patient to not drive, operate heavy machinery, avoid heights, pools, bathtubs, locked doors until cleared by further follow up outpatient by neurology. Risk of death associated with seizures / SUDEP.    Informed primary team LILIA. Bell.     Pt seen, eval and Discussed with Neurology Attending Dr. Bains.

## 2025-06-11 ENCOUNTER — TRANSCRIPTION ENCOUNTER (OUTPATIENT)
Age: 65
End: 2025-06-11

## 2025-06-11 LAB
ANION GAP SERPL CALC-SCNC: 7 MMOL/L — SIGNIFICANT CHANGE UP (ref 5–17)
BUN SERPL-MCNC: 20 MG/DL — HIGH (ref 7–18)
CALCIUM SERPL-MCNC: 8.7 MG/DL — SIGNIFICANT CHANGE UP (ref 8.4–10.5)
CHLORIDE SERPL-SCNC: 105 MMOL/L — SIGNIFICANT CHANGE UP (ref 96–108)
CO2 SERPL-SCNC: 26 MMOL/L — SIGNIFICANT CHANGE UP (ref 22–31)
CREAT SERPL-MCNC: 1.25 MG/DL — SIGNIFICANT CHANGE UP (ref 0.5–1.3)
EGFR: 63 ML/MIN/1.73M2 — SIGNIFICANT CHANGE UP
EGFR: 63 ML/MIN/1.73M2 — SIGNIFICANT CHANGE UP
GLUCOSE BLDC GLUCOMTR-MCNC: 108 MG/DL — HIGH (ref 70–99)
GLUCOSE BLDC GLUCOMTR-MCNC: 136 MG/DL — HIGH (ref 70–99)
GLUCOSE SERPL-MCNC: 116 MG/DL — HIGH (ref 70–99)
HCT VFR BLD CALC: 32.4 % — LOW (ref 39–50)
HGB BLD-MCNC: 11 G/DL — LOW (ref 13–17)
MCHC RBC-ENTMCNC: 29.6 PG — SIGNIFICANT CHANGE UP (ref 27–34)
MCHC RBC-ENTMCNC: 34 G/DL — SIGNIFICANT CHANGE UP (ref 32–36)
MCV RBC AUTO: 87.1 FL — SIGNIFICANT CHANGE UP (ref 80–100)
NRBC BLD AUTO-RTO: 0 /100 WBCS — SIGNIFICANT CHANGE UP (ref 0–0)
PLATELET # BLD AUTO: 188 K/UL — SIGNIFICANT CHANGE UP (ref 150–400)
POTASSIUM SERPL-MCNC: 3.6 MMOL/L — SIGNIFICANT CHANGE UP (ref 3.5–5.3)
POTASSIUM SERPL-SCNC: 3.6 MMOL/L — SIGNIFICANT CHANGE UP (ref 3.5–5.3)
RBC # BLD: 3.72 M/UL — LOW (ref 4.2–5.8)
RBC # FLD: 12.9 % — SIGNIFICANT CHANGE UP (ref 10.3–14.5)
SODIUM SERPL-SCNC: 138 MMOL/L — SIGNIFICANT CHANGE UP (ref 135–145)
WBC # BLD: 6.06 K/UL — SIGNIFICANT CHANGE UP (ref 3.8–10.5)
WBC # FLD AUTO: 6.06 K/UL — SIGNIFICANT CHANGE UP (ref 3.8–10.5)

## 2025-06-11 PROCEDURE — 99233 SBSQ HOSP IP/OBS HIGH 50: CPT

## 2025-06-11 PROCEDURE — 95816 EEG AWAKE AND DROWSY: CPT | Mod: 26

## 2025-06-11 RX ORDER — LOSARTAN POTASSIUM 100 MG/1
1 TABLET, FILM COATED ORAL
Refills: 0 | DISCHARGE

## 2025-06-11 RX ORDER — LEVETIRACETAM 10 MG/ML
1 INJECTION, SOLUTION INTRAVENOUS
Refills: 0 | DISCHARGE

## 2025-06-11 RX ORDER — DUTASTERIDE 0.5 MG/1
1 CAPSULE, LIQUID FILLED ORAL
Refills: 0 | DISCHARGE

## 2025-06-11 RX ORDER — ASPIRIN 325 MG
1 TABLET ORAL
Refills: 0 | DISCHARGE

## 2025-06-11 RX ORDER — FENOFIBRATE 160 MG/1
1 TABLET ORAL
Refills: 0 | DISCHARGE

## 2025-06-11 RX ORDER — METFORMIN HYDROCHLORIDE 850 MG/1
1 TABLET ORAL
Refills: 0 | DISCHARGE

## 2025-06-11 RX ORDER — LOSARTAN POTASSIUM AND HYDROCHLOROTHIAZIDE 12.5; 5 MG/1; MG/1
1 TABLET ORAL
Refills: 0 | DISCHARGE

## 2025-06-11 RX ORDER — FLUTICASONE FUROATE, UMECLIDINIUM BROMIDE AND VILANTEROL TRIFENATATE 100; 62.5; 25 UG/1; UG/1; UG/1
1 POWDER RESPIRATORY (INHALATION)
Refills: 0 | DISCHARGE

## 2025-06-11 RX ORDER — TAMSULOSIN HYDROCHLORIDE 0.4 MG/1
1 CAPSULE ORAL
Refills: 0 | DISCHARGE

## 2025-06-11 RX ORDER — METOPROLOL SUCCINATE 50 MG/1
1 TABLET, EXTENDED RELEASE ORAL
Refills: 0 | DISCHARGE

## 2025-06-11 RX ORDER — ESCITALOPRAM OXALATE 20 MG/1
2 TABLET ORAL
Refills: 0 | DISCHARGE

## 2025-06-11 RX ORDER — EZETIMIBE 10 MG/1
1 TABLET ORAL
Refills: 0 | DISCHARGE

## 2025-06-11 RX ORDER — ROSUVASTATIN CALCIUM 20 MG/1
1 TABLET, FILM COATED ORAL
Refills: 0 | DISCHARGE

## 2025-06-11 RX ORDER — TIRZEPATIDE 7.5 MG/.5ML
15 INJECTION, SOLUTION SUBCUTANEOUS
Refills: 0 | DISCHARGE

## 2025-06-11 RX ADMIN — LEVETIRACETAM 500 MILLIGRAM(S): 10 INJECTION, SOLUTION INTRAVENOUS at 05:45

## 2025-06-11 RX ADMIN — FINASTERIDE 5 MILLIGRAM(S): 1 TABLET, FILM COATED ORAL at 12:22

## 2025-06-11 RX ADMIN — FENOFIBRATE 145 MILLIGRAM(S): 160 TABLET ORAL at 12:21

## 2025-06-11 RX ADMIN — EZETIMIBE 10 MILLIGRAM(S): 10 TABLET ORAL at 12:20

## 2025-06-11 RX ADMIN — ROSUVASTATIN CALCIUM 40 MILLIGRAM(S): 20 TABLET, FILM COATED ORAL at 22:26

## 2025-06-11 RX ADMIN — LEVETIRACETAM 500 MILLIGRAM(S): 10 INJECTION, SOLUTION INTRAVENOUS at 17:21

## 2025-06-11 RX ADMIN — Medication 81 MILLIGRAM(S): at 12:20

## 2025-06-11 RX ADMIN — TAMSULOSIN HYDROCHLORIDE 0.4 MILLIGRAM(S): 0.4 CAPSULE ORAL at 22:26

## 2025-06-11 RX ADMIN — ESCITALOPRAM OXALATE 10 MILLIGRAM(S): 20 TABLET ORAL at 12:20

## 2025-06-11 NOTE — EEG REPORT - NS EEG TEXT BOX
SHAYNE PLASENCIA [id number]     Study Date: 06-11-25  Duration: 25 min    --------------------------------------------------------------------------------------------------  History:  CC/ HPI Patient is a 65y old  Male who presents with a chief complaint of encephalopathy ISO CADASIL (11 Jun 2025 12:54)    MEDICATIONS  (STANDING):  aspirin enteric coated 81 milliGRAM(s) Oral daily  escitalopram 10 milliGRAM(s) Oral daily  ezetimibe 10 milliGRAM(s) Oral daily  fenofibrate Tablet 145 milliGRAM(s) Oral daily  finasteride 5 milliGRAM(s) Oral daily  hydrochlorothiazide 12.5 milliGRAM(s) Oral daily  levETIRAcetam 500 milliGRAM(s) Oral two times a day  losartan 50 milliGRAM(s) Oral daily  metoprolol succinate ER 25 milliGRAM(s) Oral daily  rosuvastatin 40 milliGRAM(s) Oral at bedtime  sodium chloride 0.9%. 1000 milliLiter(s) (75 mL/Hr) IV Continuous <Continuous>  tamsulosin 0.4 milliGRAM(s) Oral at bedtime    --------------------------------------------------------------------------------------------------  Study Interpretation:    [[[Abbreviation Key:  PDR=alpha rhythm/posterior dominant rhythm. A-P=anterior posterior gradient.  Amplitude: ‘very low’:<20; ‘low’:20-50; ‘medium’:; ‘high’:>200uV.  Persistence for periodic/rhythmic patterns (% of epoch) ‘rare’:<1%; ‘occasional’:1-10%; ‘frequent’:10-50%; ‘abundant’:50-90%; ‘continuous’:>90%.  Persistence for sporadic discharges: ‘rare’:<1/hr; ‘occasional’:1/min-1/hr; ‘frequent’:>1/min; ‘abundant’:>1/10 sec.  GRDA=generalized rhythmic delta activity; FIRDA=frontal intermittent GRDA; LRDA=lateralized rhythmic delta activity; TIRDA=temporal intermittent rhythmic delta activity;  LPD=PLED=lateralized periodic discharges; GPD=generalized periodic discharges; BiPDs=BiPLEDs=bilateral independent periodic epileptiform discharges; SIRPID=stimulus induced rhythmic, periodic, or ictal appearing discharges; BIRDs=brief potentially ictal rhythmic discharges >4 Hz, lasting .5-10s; PFA=paroxysmal bursts of beta/gamma; LVFA=low voltage fast activity.  Modifiers: +F=with fast component; +S=with spike component; +R=with rhythmic component.  S-B=burst suppression pattern.  Max=maximal. N1-drowsy; N2-stage II sleep; N3-slow wave sleep. SSS/BETS=small sharp spikes/benign epileptiform transients of sleep. HV=hyperventilation; PS=photic stimulation]]]    FINDINGS:      Background:  Continuity: Continuous  Symmetry: Symmetric  PDR: 8.5 - 9 Hz activity, with amplitude to 40 uV, that attenuated to eye opening.    Reactivity: Present  Voltage: Normal (between 20-150uV)  Anterior Posterior Gradient: Present  Other background findings: None  Breach: Absent    Background Slowing:  Generalized slowing: None was present.  Focal slowing: None was present.    State Changes:   -Drowsiness noted with increased slowing, attenuation of fast activity, vertex transients.  -N2 sleep transients were not recorded.    Interictal Findings:  None    Electrographic and Electroclinical seizures:  None    Other Clinical Events:  None    Activation Procedures:   -Hyperventilation was performed and did not elicit any abnormalities.     -Photic stimulation was performed and did not elicit any abnormalities.       Artifacts:  Intermittent myogenic and movement artifacts were noted.    ECG:  The heart rate on single channel ECG was predominantly between 50 - 60 BPM.    EEG Classification / Summary:  Normal EEG study in the awake / drowsy states    -----------------------------------------------------------------------------------------------------    Clinical Impression:  Normal EEG study  There were no epileptiform abnormalities or seizures recorded.      This is a preliminary impression still pending attendings attestation.     -------------------------------------------------------------------------------------------------------  EEG reading room: [phone]    After-hours epilepsy service: [phone]    Shin Acosta DO  Epilepsy Fellow SHAYNE PLASENCIA [id number]     Study Date: 06-11-25  Duration: 25 min    --------------------------------------------------------------------------------------------------  History:  CC/ HPI Patient is a 65y old  Male who presents with a chief complaint of encephalopathy ISO CADASIL (11 Jun 2025 12:54)    MEDICATIONS  (STANDING):  aspirin enteric coated 81 milliGRAM(s) Oral daily  escitalopram 10 milliGRAM(s) Oral daily  ezetimibe 10 milliGRAM(s) Oral daily  fenofibrate Tablet 145 milliGRAM(s) Oral daily  finasteride 5 milliGRAM(s) Oral daily  hydrochlorothiazide 12.5 milliGRAM(s) Oral daily  levETIRAcetam 500 milliGRAM(s) Oral two times a day  losartan 50 milliGRAM(s) Oral daily  metoprolol succinate ER 25 milliGRAM(s) Oral daily  rosuvastatin 40 milliGRAM(s) Oral at bedtime  sodium chloride 0.9%. 1000 milliLiter(s) (75 mL/Hr) IV Continuous <Continuous>  tamsulosin 0.4 milliGRAM(s) Oral at bedtime    --------------------------------------------------------------------------------------------------  Study Interpretation:    [[[Abbreviation Key:  PDR=alpha rhythm/posterior dominant rhythm. A-P=anterior posterior gradient.  Amplitude: ‘very low’:<20; ‘low’:20-50; ‘medium’:; ‘high’:>200uV.  Persistence for periodic/rhythmic patterns (% of epoch) ‘rare’:<1%; ‘occasional’:1-10%; ‘frequent’:10-50%; ‘abundant’:50-90%; ‘continuous’:>90%.  Persistence for sporadic discharges: ‘rare’:<1/hr; ‘occasional’:1/min-1/hr; ‘frequent’:>1/min; ‘abundant’:>1/10 sec.  GRDA=generalized rhythmic delta activity; FIRDA=frontal intermittent GRDA; LRDA=lateralized rhythmic delta activity; TIRDA=temporal intermittent rhythmic delta activity;  LPD=PLED=lateralized periodic discharges; GPD=generalized periodic discharges; BiPDs=BiPLEDs=bilateral independent periodic epileptiform discharges; SIRPID=stimulus induced rhythmic, periodic, or ictal appearing discharges; BIRDs=brief potentially ictal rhythmic discharges >4 Hz, lasting .5-10s; PFA=paroxysmal bursts of beta/gamma; LVFA=low voltage fast activity.  Modifiers: +F=with fast component; +S=with spike component; +R=with rhythmic component.  S-B=burst suppression pattern.  Max=maximal. N1-drowsy; N2-stage II sleep; N3-slow wave sleep. SSS/BETS=small sharp spikes/benign epileptiform transients of sleep. HV=hyperventilation; PS=photic stimulation]]]    FINDINGS:      Background:  Continuity: Continuous  Symmetry: Symmetric  PDR: 8 Hz, reactive to eye closure  Voltage: Normal  Anterior Posterior Gradient: Present, low-amplitude frontal beta  Other background findings: None  Breach: Absent    Background Slowing:  Generalized slowing: As above  Focal slowing: Occasional left and right frontotemporal focal polymorphic delta slowing    State Changes:   Drowsiness and stage 2 sleep are not captured.    Interictal Findings:  None    Electrographic and Electroclinical seizures:  None    Other Clinical Events:  None    Activation Procedures:   -Hyperventilation was performed and did not elicit any abnormalities.     -Photic stimulation was performed and did not elicit any abnormalities.       Artifacts:  Intermittent myogenic and movement artifacts    Single-lead EKG: Limited by artifact    EEG Classification / Summary:  Abnormal routine EEG in the awake state.  Mild diffuse slowing.  No focal or epileptiform abnormalities.  No electrographic seizures.    Clinical Impression:  Mild diffuse cerebral dysfunction is nonspecific in etiology.  No epileptiform abnormalities or seizures captured.      -------------------------------------------------------------------------------------------------------  EEG reading room: [phone]  After-hours epilepsy service: [phone]    Shin Acosta DO  Epilepsy Fellow    Irene Chin MD  Attending Physician, [facility] [place]  SHAYNE PLASENCIA [id number]     Study Date: 06-11-25  Duration: 25 min    --------------------------------------------------------------------------------------------------  History:  CC/ HPI Patient is a 65y old  Male who presents with a chief complaint of encephalopathy ISO CADASIL (11 Jun 2025 12:54)    MEDICATIONS  (STANDING):  aspirin enteric coated 81 milliGRAM(s) Oral daily  escitalopram 10 milliGRAM(s) Oral daily  ezetimibe 10 milliGRAM(s) Oral daily  fenofibrate Tablet 145 milliGRAM(s) Oral daily  finasteride 5 milliGRAM(s) Oral daily  hydrochlorothiazide 12.5 milliGRAM(s) Oral daily  levETIRAcetam 500 milliGRAM(s) Oral two times a day  losartan 50 milliGRAM(s) Oral daily  metoprolol succinate ER 25 milliGRAM(s) Oral daily  rosuvastatin 40 milliGRAM(s) Oral at bedtime  sodium chloride 0.9%. 1000 milliLiter(s) (75 mL/Hr) IV Continuous <Continuous>  tamsulosin 0.4 milliGRAM(s) Oral at bedtime    --------------------------------------------------------------------------------------------------  Study Interpretation:    [[[Abbreviation Key:  PDR=alpha rhythm/posterior dominant rhythm. A-P=anterior posterior gradient.  Amplitude: ‘very low’:<20; ‘low’:20-50; ‘medium’:; ‘high’:>200uV.  Persistence for periodic/rhythmic patterns (% of epoch) ‘rare’:<1%; ‘occasional’:1-10%; ‘frequent’:10-50%; ‘abundant’:50-90%; ‘continuous’:>90%.  Persistence for sporadic discharges: ‘rare’:<1/hr; ‘occasional’:1/min-1/hr; ‘frequent’:>1/min; ‘abundant’:>1/10 sec.  GRDA=generalized rhythmic delta activity; FIRDA=frontal intermittent GRDA; LRDA=lateralized rhythmic delta activity; TIRDA=temporal intermittent rhythmic delta activity;  LPD=PLED=lateralized periodic discharges; GPD=generalized periodic discharges; BiPDs=BiPLEDs=bilateral independent periodic epileptiform discharges; SIRPID=stimulus induced rhythmic, periodic, or ictal appearing discharges; BIRDs=brief potentially ictal rhythmic discharges >4 Hz, lasting .5-10s; PFA=paroxysmal bursts of beta/gamma; LVFA=low voltage fast activity.  Modifiers: +F=with fast component; +S=with spike component; +R=with rhythmic component.  S-B=burst suppression pattern.  Max=maximal. N1-drowsy; N2-stage II sleep; N3-slow wave sleep. SSS/BETS=small sharp spikes/benign epileptiform transients of sleep. HV=hyperventilation; PS=photic stimulation]]]    FINDINGS:      Background:  Continuity: Continuous  Symmetry: Symmetric  PDR: 8 Hz, reactive to eye closure  Voltage: Normal  Anterior Posterior Gradient: Present, low-amplitude frontal beta  Other background findings: None  Breach: Absent    Background Slowing:  Generalized slowing: As above  Focal slowing: Occasional left and right frontotemporal focal polymorphic delta slowing    State Changes:   Drowsiness and stage 2 sleep are not captured.    Interictal Findings:  None    Electrographic and Electroclinical seizures:  None    Other Clinical Events:  None    Activation Procedures:   -Hyperventilation was performed and did not elicit any abnormalities.     -Photic stimulation was performed and did not elicit any abnormalities.       Artifacts:  Intermittent myogenic and movement artifacts    Single-lead EKG: Limited by artifact    EEG Classification / Summary:  Abnormal routine EEG in the awake state.  Occasional left and right frontotemporal focal slowing.  Mild diffuse slowing.  No epileptiform abnormalities or electrographic seizures captured.     Clinical Impression:  Bilateral frontotemporal focal cerebral dysfunction can be structural or functional in etiology.   Mild diffuse cerebral dysfunction is nonspecific in etiology.  No epileptiform abnormalities or seizures captured.      -------------------------------------------------------------------------------------------------------  EEG reading room: [phone]  After-hours epilepsy service: [phone]    Shin Acosta DO  Epilepsy Fellow    Irene Chin MD  Attending Physician, [facility] [place]

## 2025-06-11 NOTE — DISCHARGE NOTE PROVIDER - NSDCFUSCHEDAPPT_GEN_ALL_CORE_FT
[facility]  UROLOGY [address]   Scheduled Appointment: 07/22/2025    Ron Rudolph  [facility]  UROLOGY [address]   Scheduled Appointment: 07/22/2025    [facility] Espinal  [facility] [address]   Scheduled Appointment: 08/04/2025    Tom Cross  [facility] [address]   Scheduled Appointment: 08/26/2025

## 2025-06-11 NOTE — PHARMACOTHERAPY INTERVENTION NOTE - COMMENTS
Provided patient counseling on pt's new medications and side effects of those medications. Briefly went over inpatient medications as well. Answered pt's questions.

## 2025-06-11 NOTE — DISCHARGE NOTE PROVIDER - NSDCCPCAREPLAN_GEN_ALL_CORE_FT
PRINCIPAL DISCHARGE DIAGNOSIS  Diagnosis: Acute encephalopathy  Assessment and Plan of Treatment: in setting of CADASIL.  You were brought to ED due to increased confusion x 1 day  reported by your spouse.  You were followed by neurology, had CT head and brain MRI.  Your CT head was unremarkable, MRI of your brain noted consistent with CADASIL with chronic microvascular changes and multiple chronic lacunar infarcts, no acute infarct or bleed present.  EEG revealed bilateral frontotemporal focal cerebral dysfunction can be structural or functional in etiology.  Mild diffuse cerebral dysfunction is nonspecific in etiology.  Neurology suspects nonconvulsive seizure vs. transient ischemic attack vs. sequelae of toxic/metabolic encephalopathy in the setting of acute kidney injury and recent emesis.  Recommends to continue medications as prior to hospitalization.  Your mentation gradually improved to now back to base line.  -Please take medications as prescribed   - Seizure, fall and aspiration precautions  - Given concern for seizure, do not drive, operate heavy machinery, avoid heights, pools, bathtubs, locked doors until cleared by further follow up outpatient by neurology.   -Follow up with outpatient neurologist Dr. Marce Keith        SECONDARY DISCHARGE DIAGNOSES  Diagnosis: BPH (benign prostatic hyperplasia)  Assessment and Plan of Treatment: Continue prostate medications as prior to hospitalization  Follow up with PCP  Follow up with urology    Diagnosis: Diabetes  Assessment and Plan of Treatment: Your diabetes is well controlled on current regimen as evidenced by Hemoglobin A1C 6.0.  -Continue diabetes management as prior to hospitalization  -Follow up with your PCP    Diagnosis: Hyperlipidemia  Assessment and Plan of Treatment: Continue cholesterol medications as prior to hospitalization  Low fat diet  Follow up with your PCP     PRINCIPAL DISCHARGE DIAGNOSIS  Diagnosis: Brain TIA  Assessment and Plan of Treatment: In setting of CADASIL.  You were brought to ED due to increased confusion x 1 day  reported by your spouse.  You were followed by neurology, had CT head and brain MRI.  Your CT head was unremarkable, MRI of your brain noted consistent with CADASIL with chronic microvascular changes and multiple chronic lacunar infarcts, no acute infarct or bleed present.  EEG revealed bilateral frontotemporal focal cerebral dysfunction can be structural or functional in etiology.  Mild diffuse cerebral dysfunction is nonspecific in etiology.  Neurology suspects transient ischemic attack in the setting of acute kidney injury and recent emesis.  Recommends to continue medications as prior to hospitalization.  Your mentation gradually improved to now back to base line.  -Continue taking your aspirin every other day  -Please take medications as prescribed   - Seizure, fall and aspiration precautions  - Given concern for seizure, do not drive, operate heavy machinery, avoid heights, pools, bathtubs, locked doors until cleared by further follow up outpatient by neurology.   -Follow up with outpatient neurologist Dr. Marce Keith        SECONDARY DISCHARGE DIAGNOSES  Diagnosis: BPH (benign prostatic hyperplasia)  Assessment and Plan of Treatment: Continue prostate medications as prior to hospitalization  Follow up with PCP  Follow up with urology    Diagnosis: Diabetes  Assessment and Plan of Treatment: Your diabetes is well controlled on current regimen as evidenced by Hemoglobin A1C 6.0.  -Continue diabetes management as prior to hospitalization  -Follow up with your PCP    Diagnosis: Hyperlipidemia  Assessment and Plan of Treatment: Continue cholesterol medications as prior to hospitalization  Low fat diet  Follow up with your PCP     PRINCIPAL DISCHARGE DIAGNOSIS  Diagnosis: Brain TIA  Assessment and Plan of Treatment: In setting of CADASIL.  You were brought to ED due to increased confusion x 1 day  reported by your spouse.  You were followed by neurology, had CT head and brain MRI.  Your CT head was unremarkable, MRI of your brain noted consistent with CADASIL with chronic microvascular changes and multiple chronic lacunar infarcts, no acute infarct or bleed present. EEG revealed bilateral frontotemporal focal cerebral dysfunction can be structural or functional in etiology.  Mild diffuse cerebral dysfunction is nonspecific in etiology.  Neurology suspects transient ischemic attack in the setting of acute kidney injury and recent emesis.  Recommends to continue medications as prior to hospitalization.  Your mentation gradually improved to now back to base line.  -Continue taking your aspirin every other day  -Please take medications as prescribed, including all cholesterol lowering medications  -continue your current dose of keppra  - Seizure, fall and aspiration precautions  - Given concern for seizure, do not drive, operate heavy machinery, avoid heights, pools, bathtubs, locked doors until cleared by further follow up outpatient by neurology.   -Follow up with outpatient neurologist Dr. Marce Keith        SECONDARY DISCHARGE DIAGNOSES  Diagnosis: BPH (benign prostatic hyperplasia)  Assessment and Plan of Treatment: Continue prostate medications as prior to hospitalization  Follow up with PCP.  Follow up with urology    Diagnosis: Diabetes  Assessment and Plan of Treatment: Your diabetes is well controlled on current regimen as evidenced by Hemoglobin A1C 6.0.  -Continue diabetes management as prior to hospitalization  -Follow up with your PCP    Diagnosis: Hyperlipidemia  Assessment and Plan of Treatment: Continue cholesterol medications as prior to hospitalization  Low fat diet  Follow up with your PCP

## 2025-06-11 NOTE — DISCHARGE NOTE PROVIDER - CARE PROVIDER_API CALL
Marce Keith  [facility], [address] [place] [address], [address]  Phone Icon  [phone]  Phone: [phone]  Fax: (   )    -  Established Patient  Follow Up Time: 1 week

## 2025-06-11 NOTE — DISCHARGE NOTE PROVIDER - PROVIDER TOKENS
FREE:[LAST:[Sagar],FIRST:[Marce],PHONE:[phone]],FAX:[(   )    -],ADDRESS:[[address] [place] [address], [facility], [place], [place], [address]  Phone Icon  [phone]],FOLLOWUP:[1 week],ESTABLISHEDPATIENT:[T]]

## 2025-06-11 NOTE — DISCHARGE NOTE PROVIDER - ATTENDING DISCHARGE PHYSICAL EXAMINATION:
middle aged male, Calm, sitting up in chair, NAD  AOx3, conversant  NC/AT  RRR, +s1/s2  Lungs grossly clear, normal respiratory effort  abdomen soft ntnd  extremities warm, well perfused    Vital Signs Last 24 Hrs  T(C): 36.6 (12 Jun 2025 14:40), Max: 37 (11 Jun 2025 20:41)  T(F): 97.8 (12 Jun 2025 14:40), Max: 98.6 (11 Jun 2025 20:41)  HR: 64 (12 Jun 2025 14:40) (55 - 64)  BP: 118/75 (12 Jun 2025 14:40) (113/71 - 130/76)  BP(mean): 85 (12 Jun 2025 05:16) (85 - 94)  RR: 18 (12 Jun 2025 14:40) (16 - 18)  SpO2: 99% (12 Jun 2025 14:40) (98% - 100%)    Parameters below as of 12 Jun 2025 14:40  Patient On (Oxygen Delivery Method): room air

## 2025-06-11 NOTE — DISCHARGE NOTE PROVIDER - HOSPITAL COURSE
65 yr old male with hx of CADASIL (neuro genetic disorder, sees Dr. Marce Keith at [facility]), HTN, BPH, DM, seizures on keppra 500mg BID who came to the ER for nausea/vomiting for one day and increased encephalopathy since morning of admission. 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 He does not take any medications for it at this time. Per wife at bedside, this morning he woke up more confused and forgot members of his family's names, wore swim trunks to breakfast, and thought it was the winter. No known sick contacts.  Pt. is awake and alert and able to have a conversation, though inappropriately answers some questions. Admitted for acute encephalopathy.  Pt. followed by neurology.  [facility] WNL.  MRI brain without con consistent with CADASIL with chronic microvascular changes and multiple chronic lacunar infarcts, no acute infarct or bleed present.  EEG revealed bilateral frontotemporal focal cerebral dysfunction can be structural or functional in etiology.  Mild diffuse cerebral dysfunction is nonspecific in etiology.  No epileptiform abnormalities or seizures captured.    Neuro suspects  nonconvulsive seizure vs. transient ischemic attack vs. sequelae of toxic/metabolic encephalopathy in the setting of acute kidney injury and recent emesis.  Recommends:  Secondary stroke prevention & Seizure prophylaxis  - C/W his Home regimen ASA 81mg PO Daily while inpatient --> Every other day upon discharge (due to history of easy bruising and bleeding on ASA 81mg PO Daily)  - Can C/W Home Rosuvastatin 40mg PO QHS.   - C/W Home Levetiracetam 500mg BID  - f/u with outside Neurologist Dr. Keith  cell phone number [phone].    Pt.'s mentation is now back to base line, A&O x3, HD stable, afebrile.  Discussed with attending.  Pt. is medically stable for discharge.         65 yr old male with hx of CADASIL (neuro genetic disorder, sees neuro Dr. Marce Keith at [place]), HTN, BPH, DM, seizures on keppra 500mg BID who came to the ER for nausea/vomiting for one day and increased encephalopathy since morning of admission. 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 He does not take any medications for it at this time. Per wife at bedside, this morning he woke up more confused and forgot members of his family's names, wore swim trunks to breakfast, and thought it was the winter. No known sick contacts.  Pt. is awake and alert and able to have a conversation, though inappropriately answers some questions. Admitted for acute encephalopathy.  Pt. followed by neurology. Neuro suspects  nonconvulsive seizure vs. transient ischemic attack vs. sequelae of toxic/metabolic encephalopathy in the setting of acute kidney injury and recent emesis.  [facility] WNL. MRI brain without con consistent with CADASIL with chronic microvascular changes and multiple chronic lacunar infarcts, no acute infarct or bleed present.  EEG revealed bilateral frontotemporal focal cerebral dysfunction can be structural or functional in etiology.  Mild diffuse cerebral dysfunction is nonspecific in etiology.  No epileptiform abnormalities or seizures captured. Neurology recommended to continue his home aspirin 81mg daily while in hospital and then upon discharge to change to every other day dosing (due to history of easy bruising and bleeding on ASA 81mg PO Daily), and to continue home rosuvastatin, zetia, and to maintain current dose of keppra. He is to follow up with his neurologist outpatient Dr Keith (cell phone number [phone]). Throughout his hospital stay, he was gradually noted to return to baseline, now currently AOx 3. Pt.'s mentation is now back to base line, A&O x3, HD stable, afebrile.  Discussed with attending.  Pt is medically stable for discharge with outpatient neuro follow up.

## 2025-06-11 NOTE — DISCHARGE NOTE PROVIDER - NSDCMRMEDTOKEN_GEN_ALL_CORE_FT
aspirin 81 mg oral capsule: 1 cap(s) orally 4 times a week  Crestor 40 mg oral tablet: 1 tab(s) orally once a day  dutasteride 0.5 mg oral capsule: 1 cap(s) orally once a day  escitalopram 5 mg oral tablet: 2 tab(s) orally once a day  fenofibrate 145 mg oral tablet: 1 tab(s) orally once a day  Hyzaar 50 mg-12.5 mg oral tablet: 1 tab(s) orally once a day  Keppra 500 mg oral tablet: 1 tab(s) orally 2 times a day  metFORMIN 1000 mg oral tablet: 1 tab(s) orally 2 times a day  metoprolol succinate 25 mg oral capsule, extended release: 1 cap(s) orally once a day  Mounjaro 15 mg/0.5 mL subcutaneous solution: 15 milligram(s) subcutaneously once a week  tamsulosin 0.4 mg oral capsule: 1 cap(s) orally once a day  Trelegy Ellipta 100 mcg-62.5 mcg-25 mcg/inh inhalation powder: 1 spray(s) inhaled once a day  Zetia 10 mg oral tablet: 1 tab(s) orally once a day

## 2025-06-11 NOTE — DISCHARGE NOTE PROVIDER - NSDCFUADDAPPT_GEN_ALL_CORE_FT
APPTS ARE READY TO BE MADE: [x] YES    Best Family or Patient Contact (if needed):    Additional Information about above appointments (if needed):    1: Pt's own Neurologist, Dr. Marce Keith, with in 1 week [phone]      Other comments or requests:

## 2025-06-12 ENCOUNTER — TRANSCRIPTION ENCOUNTER (OUTPATIENT)
Age: 65
End: 2025-06-12

## 2025-06-12 VITALS
OXYGEN SATURATION: 99 % | RESPIRATION RATE: 18 BRPM | DIASTOLIC BLOOD PRESSURE: 75 MMHG | SYSTOLIC BLOOD PRESSURE: 118 MMHG | HEART RATE: 64 BPM | TEMPERATURE: 98 F

## 2025-06-12 LAB
ANION GAP SERPL CALC-SCNC: 2 MMOL/L — LOW (ref 5–17)
BUN SERPL-MCNC: 24 MG/DL — HIGH (ref 7–18)
CALCIUM SERPL-MCNC: 8.7 MG/DL — SIGNIFICANT CHANGE UP (ref 8.4–10.5)
CHLORIDE SERPL-SCNC: 108 MMOL/L — SIGNIFICANT CHANGE UP (ref 96–108)
CO2 SERPL-SCNC: 26 MMOL/L — SIGNIFICANT CHANGE UP (ref 22–31)
CREAT SERPL-MCNC: 1.23 MG/DL — SIGNIFICANT CHANGE UP (ref 0.5–1.3)
EGFR: 64 ML/MIN/1.73M2 — SIGNIFICANT CHANGE UP
EGFR: 64 ML/MIN/1.73M2 — SIGNIFICANT CHANGE UP
GLUCOSE SERPL-MCNC: 111 MG/DL — HIGH (ref 70–99)
LEVETIRACETAM SERPL-MCNC: 12.9 UG/ML — SIGNIFICANT CHANGE UP (ref 10–40)
POTASSIUM SERPL-MCNC: 5.2 MMOL/L — SIGNIFICANT CHANGE UP (ref 3.5–5.3)
POTASSIUM SERPL-SCNC: 5.2 MMOL/L — SIGNIFICANT CHANGE UP (ref 3.5–5.3)
SODIUM SERPL-SCNC: 136 MMOL/L — SIGNIFICANT CHANGE UP (ref 135–145)

## 2025-06-12 PROCEDURE — 83690 ASSAY OF LIPASE: CPT

## 2025-06-12 PROCEDURE — 70450 CT HEAD/BRAIN W/O DYE: CPT

## 2025-06-12 PROCEDURE — 99285 EMERGENCY DEPT VISIT HI MDM: CPT | Mod: 25

## 2025-06-12 PROCEDURE — 86140 C-REACTIVE PROTEIN: CPT

## 2025-06-12 PROCEDURE — 85027 COMPLETE CBC AUTOMATED: CPT

## 2025-06-12 PROCEDURE — 87040 BLOOD CULTURE FOR BACTERIA: CPT

## 2025-06-12 PROCEDURE — 71045 X-RAY EXAM CHEST 1 VIEW: CPT

## 2025-06-12 PROCEDURE — 85652 RBC SED RATE AUTOMATED: CPT

## 2025-06-12 PROCEDURE — 99233 SBSQ HOSP IP/OBS HIGH 50: CPT

## 2025-06-12 PROCEDURE — 70551 MRI BRAIN STEM W/O DYE: CPT

## 2025-06-12 PROCEDURE — 83036 HEMOGLOBIN GLYCOSYLATED A1C: CPT

## 2025-06-12 PROCEDURE — 70496 CT ANGIOGRAPHY HEAD: CPT

## 2025-06-12 PROCEDURE — 80053 COMPREHEN METABOLIC PANEL: CPT

## 2025-06-12 PROCEDURE — 95957 EEG DIGITAL ANALYSIS: CPT

## 2025-06-12 PROCEDURE — 70498 CT ANGIOGRAPHY NECK: CPT

## 2025-06-12 PROCEDURE — 82962 GLUCOSE BLOOD TEST: CPT

## 2025-06-12 PROCEDURE — 80307 DRUG TEST PRSMV CHEM ANLYZR: CPT

## 2025-06-12 PROCEDURE — 36415 COLL VENOUS BLD VENIPUNCTURE: CPT

## 2025-06-12 PROCEDURE — 80048 BASIC METABOLIC PNL TOTAL CA: CPT

## 2025-06-12 PROCEDURE — 81003 URINALYSIS AUTO W/O SCOPE: CPT

## 2025-06-12 PROCEDURE — 95816 EEG AWAKE AND DROWSY: CPT

## 2025-06-12 PROCEDURE — 82550 ASSAY OF CK (CPK): CPT

## 2025-06-12 PROCEDURE — 80177 DRUG SCRN QUAN LEVETIRACETAM: CPT

## 2025-06-12 PROCEDURE — 0042T: CPT

## 2025-06-12 PROCEDURE — 96374 THER/PROPH/DIAG INJ IV PUSH: CPT

## 2025-06-12 PROCEDURE — 85025 COMPLETE CBC W/AUTO DIFF WBC: CPT

## 2025-06-12 PROCEDURE — 99239 HOSP IP/OBS DSCHRG MGMT >30: CPT

## 2025-06-12 RX ADMIN — FINASTERIDE 5 MILLIGRAM(S): 1 TABLET, FILM COATED ORAL at 12:15

## 2025-06-12 RX ADMIN — Medication 81 MILLIGRAM(S): at 12:13

## 2025-06-12 RX ADMIN — ESCITALOPRAM OXALATE 10 MILLIGRAM(S): 20 TABLET ORAL at 12:14

## 2025-06-12 RX ADMIN — FENOFIBRATE 145 MILLIGRAM(S): 160 TABLET ORAL at 12:14

## 2025-06-12 RX ADMIN — EZETIMIBE 10 MILLIGRAM(S): 10 TABLET ORAL at 12:14

## 2025-06-12 RX ADMIN — LEVETIRACETAM 500 MILLIGRAM(S): 10 INJECTION, SOLUTION INTRAVENOUS at 05:59

## 2025-06-12 NOTE — PROGRESS NOTE ADULT - PROBLEM SELECTOR PLAN 6
c/w home losartan/hctz, metoprolol w holding parameters  BP controlled
c/w home losartan/hctz, metoprolol w holding parameters  BP controlled

## 2025-06-12 NOTE — PROGRESS NOTE ADULT - REASON FOR ADMISSION
encephalopathy ISO CADASIL

## 2025-06-12 NOTE — PROGRESS NOTE ADULT - SUBJECTIVE AND OBJECTIVE BOX
NEUROLOGY FOLLOW-UP NOTE    NAME:  SHYANE PLASENCIA      ASSESSMENT:  65 RHM with transient encephalopathy in the setting of CADASIL and associated epilepsy, concerning for transient ischemic attack vs. transient global amnesia, without neuroimaging evidence of acute ischemic stroke nor neurodiagnostic evidence of breakthrough seizures      RECOMMENDATIONS:    - Maintain Q4H VS & Neurochecks while inpatient    - Continue Aspirin 81mg PO Daily while inpatient and Aspirin 81mg PO QOD on discharged (due to history of easy bruising)    - Continue home Rosuvastatin 40mg PO QHS and Ezetimibe 10mg PO Daily    - Continue home Levetiracetam 500mg PO BID - Given absence of subclinical seizures on routine EEG, no need to increase this dose    - Routine follow-up with established neurologist, Dr. Marce Keith    - DVT ppx: SCDs          NOTE TO BE COMPLETED - PLEASE REFER TO ABOVE ONLY AND IGNORE INFORMATION BELOW    ******************************    HPI:  65 yr old male with hx of CADASIL (neuro genetic disorder, sees Dr. Marce Keith at [place]), HTN, BPH, DM, seizures on keppra 500mg BID who came to the ER for nausea/vomiting for one day and increased encephalopathy since morning of admission. 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 He does not take any medications for it at this time. Per wife at bedside, this morning he woke up more confused and forgot members of his family's names, wore swim trunks to breakfast, and thought it was the winter. No known sick contacts. No headache, fever, dizziness, flank pain, dysuria, skin infection, diarrhea, or respiratory symptoms. No photophobia or weakness. He is awake and alert and able to have a conversation, though inappropriately answers some questions.     He will be admitted for MRI ISO acute encephalopathy.     ED Course    Vitals: tmax 37.3; rest wnl  Labs: BUN/Cr 28/1.28 rest grossly wnl  Intervention: 1L IVF   Consults: neurology  Images: CT wnl; MR brain pending    (09 Jun 2025 19:13)      NEURO HPI:      INTERVAL HISTORY:      MEDICATIONS:  aluminum hydroxide/magnesium hydroxide/simethicone Suspension 30 milliLiter(s) Oral every 4 hours PRN  aspirin enteric coated 81 milliGRAM(s) Oral daily  escitalopram 10 milliGRAM(s) Oral daily  ezetimibe 10 milliGRAM(s) Oral daily  fenofibrate Tablet 145 milliGRAM(s) Oral daily  finasteride 5 milliGRAM(s) Oral daily  hydrochlorothiazide 12.5 milliGRAM(s) Oral daily  levETIRAcetam 500 milliGRAM(s) Oral two times a day  losartan 50 milliGRAM(s) Oral daily  melatonin 3 milliGRAM(s) Oral at bedtime PRN  metoprolol succinate ER 25 milliGRAM(s) Oral daily  ondansetron Injectable 4 milliGRAM(s) IV Push every 8 hours PRN  rosuvastatin 40 milliGRAM(s) Oral at bedtime  tamsulosin 0.4 milliGRAM(s) Oral at bedtime      ALLERGIES:  No Known Allergies      REVIEW OF SYSTEMS:  Fourteen systems reviewed and negative except as in HPI / Interval History.          OBJECTIVE:  Vital Signs Last 24 Hrs  T(C): 36.7 (12 Jun 2025 05:16), Max: 37 (11 Jun 2025 20:41)  T(F): 98.1 (12 Jun 2025 05:16), Max: 98.6 (11 Jun 2025 20:41)  HR: 58 (12 Jun 2025 05:16) (51 - 58)  BP: 113/71 (12 Jun 2025 05:16) (111/73 - 130/76)  BP(mean): 85 (12 Jun 2025 05:16) (85 - 94)  RR: 18 (12 Jun 2025 05:16) (16 - 18)  SpO2: 98% (12 Jun 2025 05:16) (98% - 100%)    Parameters below as of 12 Jun 2025 05:16  Patient On (Oxygen Delivery Method): room air        General Examination:  General: No acute distress  HEENT: Atraumatic, Normocephalic  Respiratory: CTA B/l.  No crackles, rhonchi, or wheezes.  Cardiovascular: RRR.  Normal S1 & S2.  Normal b/l radial and pedal pulses.    Neurological Examination:  General / Mental Status: AAO x 3.  No aphasia or dysarthria.  Naming and repetition intact.  Cranial Nerves: VFF x 4.  PERRL.  EOMI x 2, No nystagmus or diplopia.  B/l V1-V3 equal and intact to light touch and pinprick.  Symmetric facial movement and palate elevation.  B/l hearing equal to finger rub.  5/5 strength with b/l sternocleidomastoid and trapezius.  Midline tongue protrusion, with no atrophy or fasciculations.  Motor: Normal bulk & tone in all four extremities.  5/5 strength throughout all four extremities.  No downward drift, rigidity, spasticity, or tremors in any of the four extremities.  Sensory: Intact to light touch and pinprick in all four extremities.  Negative Romberg.  Reflex: 2+ and symmetric at b/l biceps, triceps, brachioradialis, patellae, and ankles.  Downgoing toes b/l.  Coordination: No dysmetria with b/l finger-to-nose and heel raise tests.  Symmetric rapid alternating movements b/l.  Gait: Normal, narrow-based gait.  No difficulty with tiptoe, heel, and tandem gaits.        LABORATORY VALUES:                          11.0   6.06  )-----------( 188      ( 11 Jun 2025 06:10 )             32.4       06-12    136  |  108  |  24[H]  ----------------------------<  111[H]  5.2   |  26  |  1.23    Ca    8.7      12 Jun 2025 06:30                Glucose Trend  06-12-25 @ 06:30   -  111[H] -- --  06-11-25 @ 11:17   -  -- -- 136[H]  06-11-25 @ 07:36   -  -- -- 108[H]  06-11-25 @ 06:10   -  116[H] -- --  06-10-25 @ 05:23   -  115[H] -- --  06-09-25 @ 15:31   -  131[H] -- --                    NEUROIMAGING:          Please contact the Neurology consult service with any neurological questions.      Conor Bains MD   of Neurology  [facility] at [facility]        
INTERVAL HPI/OVERNIGHT EVENTS:   Seen this AM, patient did not appear to be encephalopathic but not completely oriented to this surroundings.  He knew the year, his children, the president, did not know the month  Yesterday on admission, was not able to answer who president was  MRI done overnight no infarct    REVIEW OF SYSTEMS:  no fever no chills no abdominal pain  no nausea no vomiting    Vital Signs Last 24 Hrs  T(C): 37.3 (10 Delio 2025 05:41), Max: 38.4 (10 Delio 2025 03:32)  T(F): 99.1 (10 Delio 2025 05:41), Max: 101.1 (10 Delio 2025 03:32)  HR: 61 (10 Delio 2025 05:41) (61 - 74)  BP: 117/67 (10 Delio 2025 05:41) (102/66 - 121/78)  BP(mean): 83 (10 Delio 2025 05:41) (78 - 83)  RR: 18 (10 Delio 2025 05:41) (16 - 18)  SpO2: 95% (10 Delio 2025 05:41) (94% - 99%)    Parameters below as of 10 Delio 2025 05:41  Patient On (Oxygen Delivery Method): room air        PHYSICAL EXAMINATION:  aaox2-3, somewhat oriented to situation, no distress, speaking complete sentences, unable to remember events leading to hospitalization. Heart regular, lungs are clear, abdomen soft nontender                          10.6   6.78  )-----------( 206      ( 10 Delio 2025 05:23 )             31.4     06-10    138  |  105  |  26[H]  ----------------------------<  115[H]  3.9   |  27  |  1.32[H]    Ca    9.0      10 Delio 2025 05:23    TPro  7.5  /  Alb  4.0  /  TBili  0.4  /  DBili  x   /  AST  21  /  ALT  25  /  AlkPhos  37[L]  06-09    LIVER FUNCTIONS - ( 09 Jun 2025 15:31 )  Alb: 4.0 g/dL / Pro: 7.5 g/dL / ALK PHOS: 37 U/L / ALT: 25 U/L DA / AST: 21 U/L / GGT: x                   CAPILLARY BLOOD GLUCOSE      RADIOLOGY & ADDITIONAL TESTS:                  
NEUROLOGY FOLLOW-UP NOTE    NAME:  SHAYNE PLASENCIA      ASSESSMENT:  65 RHM with transient encephalopathy in the setting of CADASIL and associated epilepsy, concerning for transient ischemic attack vs. transient global amnesia, without neuroimaging evidence of acute ischemic stroke nor neurodiagnostic evidence of breakthrough seizures      RECOMMENDATIONS:    - Maintain Q4H VS & Neurochecks while inpatient    - Continue Aspirin 81mg PO Daily while inpatient and Aspirin 81mg PO QOD on discharged (due to history of easy bruising)    - Continue home Rosuvastatin 40mg PO QHS and Ezetimibe 10mg PO Daily    - Continue home Levetiracetam 500mg PO BID    - Routine follow-up with established neurologist, Dr. Marce Keith    - DVT ppx: SCDs          NOTE TO BE COMPLETED - PLEASE REFER TO ABOVE ONLY AND IGNORE INFORMATION BELOW    ******************************    HPI:  65 yr old male with hx of CADASIL (neuro genetic disorder, sees Dr. Marce Keith at [place]), HTN, BPH, DM, seizures on keppra 500mg BID who came to the ER for nausea/vomiting for one day and increased encephalopathy since morning of admission. 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 He does not take any medications for it at this time. Per wife at bedside, this morning he woke up more confused and forgot members of his family's names, wore swim trunks to breakfast, and thought it was the winter. No known sick contacts. No headache, fever, dizziness, flank pain, dysuria, skin infection, diarrhea, or respiratory symptoms. No photophobia or weakness. He is awake and alert and able to have a conversation, though inappropriately answers some questions.     He will be admitted for MRI ISO acute encephalopathy.     ED Course    Vitals: tmax 37.3; rest wnl  Labs: BUN/Cr 28/1.28 rest grossly wnl  Intervention: 1L IVF   Consults: neurology  Images: CTh wnl; MR brain pending    (09 Jun 2025 19:13)      NEURO HPI:      INTERVAL HISTORY:      MEDICATIONS:  aluminum hydroxide/magnesium hydroxide/simethicone Suspension 30 milliLiter(s) Oral every 4 hours PRN  aspirin enteric coated 81 milliGRAM(s) Oral daily  escitalopram 10 milliGRAM(s) Oral daily  ezetimibe 10 milliGRAM(s) Oral daily  fenofibrate Tablet 145 milliGRAM(s) Oral daily  finasteride 5 milliGRAM(s) Oral daily  hydrochlorothiazide 12.5 milliGRAM(s) Oral daily  levETIRAcetam 500 milliGRAM(s) Oral two times a day  losartan 50 milliGRAM(s) Oral daily  melatonin 3 milliGRAM(s) Oral at bedtime PRN  metoprolol succinate ER 25 milliGRAM(s) Oral daily  ondansetron Injectable 4 milliGRAM(s) IV Push every 8 hours PRN  rosuvastatin 40 milliGRAM(s) Oral at bedtime  sodium chloride 0.9%. 1000 milliLiter(s) IV Continuous <Continuous>  tamsulosin 0.4 milliGRAM(s) Oral at bedtime      ALLERGIES:  No Known Allergies      REVIEW OF SYSTEMS:  Fourteen systems reviewed and negative except as in HPI / Interval History.          OBJECTIVE:  Vital Signs Last 24 Hrs  T(C): 36.7 (11 Jun 2025 13:32), Max: 37.2 (11 Jun 2025 05:27)  T(F): 98 (11 Jun 2025 13:32), Max: 98.9 (11 Jun 2025 05:27)  HR: 51 (11 Jun 2025 13:32) (51 - 62)  BP: 111/73 (11 Jun 2025 13:32) (106/61 - 120/74)  RR: 16 (11 Jun 2025 13:32) (16 - 18)  SpO2: 98% (11 Jun 2025 13:32) (97% - 98%)  Parameters below as of 11 Jun 2025 13:32  Patient On (Oxygen Delivery Method): room air      General Examination:  General: No acute distress  HEENT: Atraumatic, Normocephalic  Respiratory: CTA B/l.  No crackles, rhonchi, or wheezes.  Cardiovascular: RRR.  Normal S1 & S2.  Normal b/l radial and pedal pulses.    Neurological Examination:  General / Mental Status: AAO x 3.  No aphasia or dysarthria.  Naming and repetition intact.  Cranial Nerves: VFF x 4.  PERRL.  EOMI x 2, No nystagmus or diplopia.  B/l V1-V3 equal and intact to light touch and pinprick.  Symmetric facial movement and palate elevation.  B/l hearing equal to finger rub.  5/5 strength with b/l sternocleidomastoid and trapezius.  Midline tongue protrusion, with no atrophy or fasciculations.  Motor: Normal bulk & tone in all four extremities.  5/5 strength throughout all four extremities.  No downward drift, rigidity, spasticity, or tremors in any of the four extremities.  Sensory: Intact to light touch and pinprick in all four extremities.  Negative Romberg.  Reflex: 2+ and symmetric at b/l biceps, triceps, brachioradialis, patellae, and ankles.  Downgoing toes b/l.  Coordination: No dysmetria with b/l finger-to-nose and heel raise tests.  Symmetric rapid alternating movements b/l.  Gait: Normal, narrow-based gait.  No difficulty with tiptoe, heel, and tandem gaits.          LABORATORY VALUES:                          11.0   6.06  )-----------( 188      ( 11 Jun 2025 06:10 )             32.4       06-11    138  |  105  |  20[H]  ----------------------------<  116[H]  3.6   |  26  |  1.25    Ca    8.7      11 Jun 2025 06:10    Glucose Trend  06-11-25 @ 11:17   -  -- -- 136[H]  06-11-25 @ 07:36   -  -- -- 108[H]  06-11-25 @ 06:10   -  116[H] -- --  06-10-25 @ 05:23   -  115[H] -- --  06-09-25 @ 15:31   -  131[H] -- --                    NEUROIMAGING:          Please contact the Neurology consult service with any neurological questions.      Conor Bains MD   of Neurology  [facility] [employer] [facility] at [facility]        
Vital Signs Last 24 Hrs  T(C): 37.2 (11 Jun 2025 05:27), Max: 37.2 (11 Jun 2025 05:27)  T(F): 98.9 (11 Jun 2025 05:27), Max: 98.9 (11 Jun 2025 05:27)  HR: 54 (11 Jun 2025 07:38) (54 - 62)  BP: 114/65 (11 Jun 2025 07:38) (106/61 - 120/74)  BP(mean): --  RR: 18 (11 Jun 2025 05:27) (18 - 18)  SpO2: 97% (11 Jun 2025 05:27) (96% - 97%)    Parameters below as of 11 Jun 2025 05:27  Patient On (Oxygen Delivery Method): room air                          11.0   6.06  )-----------( 188      ( 11 Jun 2025 06:10 )             32.4     06-11    138  |  105  |  20[H]  ----------------------------<  116[H]  3.6   |  26  |  1.25    Ca    8.7      11 Jun 2025 06:10    TPro  7.5  /  Alb  4.0  /  TBili  0.4  /  DBili  x   /  AST  21  /  ALT  25  /  AlkPhos  37[L]  06-09    MEDICATIONS  (STANDING):  aspirin enteric coated 81 milliGRAM(s) Oral daily  escitalopram 10 milliGRAM(s) Oral daily  ezetimibe 10 milliGRAM(s) Oral daily  fenofibrate Tablet 145 milliGRAM(s) Oral daily  finasteride 5 milliGRAM(s) Oral daily  hydrochlorothiazide 12.5 milliGRAM(s) Oral daily  levETIRAcetam 500 milliGRAM(s) Oral two times a day  losartan 50 milliGRAM(s) Oral daily  metoprolol succinate ER 25 milliGRAM(s) Oral daily  rosuvastatin 40 milliGRAM(s) Oral at bedtime  sodium chloride 0.9%. 1000 milliLiter(s) (75 mL/Hr) IV Continuous <Continuous>  tamsulosin 0.4 milliGRAM(s) Oral at bedtime    MEDICATIONS  (PRN):  aluminum hydroxide/magnesium hydroxide/simethicone Suspension 30 milliLiter(s) Oral every 4 hours PRN Dyspepsia  melatonin 3 milliGRAM(s) Oral at bedtime PRN Insomnia  ondansetron Injectable 4 milliGRAM(s) IV Push every 8 hours PRN Nausea and/or Vomiting  
No acute events overnight.    S:  Feels well, does not know name of his Neurologist but states his wife knows.  No fevers/chills, N/V. Had 2 soft formed BM's overnight.  No dizziness, room spinning. SOB.    Allergies    No Known Allergies    Intolerances      Vital Signs Last 24 Hrs  T(C): 36.7 (12 Jun 2025 05:16), Max: 37 (11 Jun 2025 20:41)  T(F): 98.1 (12 Jun 2025 05:16), Max: 98.6 (11 Jun 2025 20:41)  HR: 58 (12 Jun 2025 05:16) (51 - 58)  BP: 113/71 (12 Jun 2025 05:16) (111/73 - 130/76)  BP(mean): 85 (12 Jun 2025 05:16) (85 - 94)  RR: 18 (12 Jun 2025 05:16) (16 - 18)  SpO2: 98% (12 Jun 2025 05:16) (98% - 100%)    Parameters below as of 12 Jun 2025 05:16  Patient On (Oxygen Delivery Method): room air        MedsMEDICATIONS  (STANDING):  aspirin enteric coated 81 milliGRAM(s) Oral daily  escitalopram 10 milliGRAM(s) Oral daily  ezetimibe 10 milliGRAM(s) Oral daily  fenofibrate Tablet 145 milliGRAM(s) Oral daily  finasteride 5 milliGRAM(s) Oral daily  hydrochlorothiazide 12.5 milliGRAM(s) Oral daily  levETIRAcetam 500 milliGRAM(s) Oral two times a day  losartan 50 milliGRAM(s) Oral daily  metoprolol succinate ER 25 milliGRAM(s) Oral daily  rosuvastatin 40 milliGRAM(s) Oral at bedtime  tamsulosin 0.4 milliGRAM(s) Oral at bedtime    MEDICATIONS  (PRN):  aluminum hydroxide/magnesium hydroxide/simethicone Suspension 30 milliLiter(s) Oral every 4 hours PRN Dyspepsia  melatonin 3 milliGRAM(s) Oral at bedtime PRN Insomnia  ondansetron Injectable 4 milliGRAM(s) IV Push every 8 hours PRN Nausea and/or Vomiting      PHYSICAL EXAM:  GENERAL: NAD, well-groomed, well-developed  NEURO: AAOx3, MAIN b/l, 5/5 b/l UE and 5/5 b/l LE motor strength, steady gait  LUNG: Lungs clear to auscultation bilaterally, no wheezing, rhonchi, or rales.  HEART: S1, S2, no S3 or S4. Regular rate and rhythm. No murmurs, gallops, or rubs. No JVD  ABDOMEN: Bowel sounds present, abd Soft, Nontender, Nondistended  EXTREMITIES:  2+ Peripheral Pulses b/l, No clubbing, cyanosis, or edema  SKIN: No rashes or lesions    Consultant(s) Notes Reviewed:  [x ] YES  [ ] NO  Care Discussed with Consultants/Other Providers [ x] YES  [ ] NO    LABS:                        11.0   6.06  )-----------( 188      ( 11 Jun 2025 06:10 )             32.4     06-12    136  |  108  |  24[H]  ----------------------------<  111[H]  5.2   |  26  |  1.23    Ca    8.7      12 Jun 2025 06:30          RADIOLOGY & ADDITIONAL TESTS:    EKG:    
NP Note discussed with  Primary Attending    Patient is a 65y old  Male who presents with a chief complaint of encephalopathy ISO CADASIL (11 Jun 2025 12:54)      INTERVAL HPI/OVERNIGHT EVENTS: no acute events overnight    MEDICATIONS  (STANDING):  aspirin enteric coated 81 milliGRAM(s) Oral daily  escitalopram 10 milliGRAM(s) Oral daily  ezetimibe 10 milliGRAM(s) Oral daily  fenofibrate Tablet 145 milliGRAM(s) Oral daily  finasteride 5 milliGRAM(s) Oral daily  hydrochlorothiazide 12.5 milliGRAM(s) Oral daily  levETIRAcetam 500 milliGRAM(s) Oral two times a day  losartan 50 milliGRAM(s) Oral daily  metoprolol succinate ER 25 milliGRAM(s) Oral daily  rosuvastatin 40 milliGRAM(s) Oral at bedtime  sodium chloride 0.9%. 1000 milliLiter(s) (75 mL/Hr) IV Continuous <Continuous>  tamsulosin 0.4 milliGRAM(s) Oral at bedtime    MEDICATIONS  (PRN):  aluminum hydroxide/magnesium hydroxide/simethicone Suspension 30 milliLiter(s) Oral every 4 hours PRN Dyspepsia  melatonin 3 milliGRAM(s) Oral at bedtime PRN Insomnia  ondansetron Injectable 4 milliGRAM(s) IV Push every 8 hours PRN Nausea and/or Vomiting      __________________________________________________  REVIEW OF SYSTEMS:    CONSTITUTIONAL: No fever,   RESPIRATORY: No cough; No shortness of breath  CARDIOVASCULAR: No chest pain, no palpitations  GASTROINTESTINAL: No pain. No nausea or vomiting; No diarrhea   NEUROLOGICAL: No headache or numbness, no tremors  MUSCULOSKELETAL: No joint pain, no muscle pain  GENITOURINARY: no dysuria, no frequency, no hesitancy  ALL OTHER  ROS negative        Vital Signs Last 24 Hrs  T(C): 36.7 (11 Jun 2025 13:32), Max: 37.2 (11 Jun 2025 05:27)  T(F): 98 (11 Jun 2025 13:32), Max: 98.9 (11 Jun 2025 05:27)  HR: 51 (11 Jun 2025 13:32) (51 - 62)  BP: 111/73 (11 Jun 2025 13:32) (106/61 - 120/74)  BP(mean): --  RR: 16 (11 Jun 2025 13:32) (16 - 18)  SpO2: 98% (11 Jun 2025 13:32) (97% - 98%)    Parameters below as of 11 Jun 2025 13:32  Patient On (Oxygen Delivery Method): room air        ________________________________________________  PHYSICAL EXAM:  GENERAL: NAD  HEENT: Normocephalic  NECK : supple  CHEST/LUNG: Clear to auscultation bilaterally  HEART: S1 S2  regular  ABDOMEN: Soft, Nontender, Nondistended; Bowel sounds present  EXTREMITIES: no edema  SKIN: warm and dry  NERVOUS SYSTEM:  Awake and alert; Oriented  to place, person and time    _________________________________________________  LABS:                        11.0   6.06  )-----------( 188      ( 11 Jun 2025 06:10 )             32.4     06-11    138  |  105  |  20[H]  ----------------------------<  116[H]  3.6   |  26  |  1.25    Ca    8.7      11 Jun 2025 06:10        Urinalysis Basic - ( 11 Jun 2025 06:10 )    Color: x / Appearance: x / SG: x / pH: x  Gluc: 116 mg/dL / Ketone: x  / Bili: x / Urobili: x   Blood: x / Protein: x / Nitrite: x   Leuk Esterase: x / RBC: x / WBC x   Sq Epi: x / Non Sq Epi: x / Bacteria: x      CAPILLARY BLOOD GLUCOSE      POCT Blood Glucose.: 136 mg/dL (11 Jun 2025 11:17)  POCT Blood Glucose.: 108 mg/dL (11 Jun 2025 07:36)        RADIOLOGY & ADDITIONAL TESTS:    < from: MR Head No Cont (06.09.25 @ 21:17) >  FINDINGS: Multiple extensive patchy confluent nonspecific T2/FLAIR   hyperintense signal changes are noted throughout the bihemispheric white   matter without associated mass effect or restricted diffusion. There is   also involvement of the bilateral anterior temporal lobe subcortical   white matter and curvilinear hyperintense T2/FLAIR hyperintense signal   changes within the bilateral external capsules which is fairly specific   for CADASIL. A few small scattered foci of susceptibility artifact are   also seen within the right temporal lobes, right paramedian occipital   lobe, and right basal ganglia.    Multiple additional chronic lacunar infarcts are seen within the   bihemispheric white matter, cerebellar hemispheres, bilateral basal   ganglia, right side worse than left and also within the bilateral thalami.    There is no acute intrarenal hemorrhage. There is no evidence of acute   ischemia on diffusion-weighted imaging.    There is no hydrocephalus or abnormal extra-axial fluid collection.   Flow-voids are noted throughout the major intracranial vessels, on the T2   weighted images, consistent with their patency. The sellar region appears   unremarkable.    The paranasal sinuses. Minimal scattered mucosal thickening. The   tympanomastoid cavities are clear. The orbits are unremarkable. The   calvarium appears intact.    IMPRESSION: Imaging findings consistent with the patient's provided   diagnosis of CADASIL. Please see full discussion in the body the report.    No acute intracranial hemorrhage or evidence of acute ischemia.    --- End of Report ---    < end of copied text >    Imaging Personally Reviewed:  YES    Consultant(s) Notes Reviewed:   YES      Plan of care was discussed with patient and /or primary care giver; all questions and concerns were addressed and care was aligned with patient's wishes.

## 2025-06-12 NOTE — DISCHARGE NOTE NURSING/CASE MANAGEMENT/SOCIAL WORK - PATIENT PORTAL LINK FT
You can access the FollowMyHealth Patient Portal offered by [facility] by registering at the following website: [email or web address]. By joining [facility]’s FollowMyHealth portal, you will also be able to view your health information using other applications (apps) compatible with our system.

## 2025-06-12 NOTE — PROGRESS NOTE ADULT - PROBLEM SELECTOR PLAN 2
CADASIL is a rare, inherited type of vascular disease (a disease of the blood vessels such as arteries and veins) that can cause dementia. 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  followed by Dr. Marce Keith at [facility] -- cell phone number [phone]  MR brain without acute findings  F/u with outpt Neuro
CADASIL is a rare, inherited type of vascular disease (a disease of the blood vessels such as arteries and veins) that can cause dementia. 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  followed by Dr. Marce Keith at [facility] -- cell phone number [phone]  MR brain without acute findings  rest of plan as above

## 2025-06-12 NOTE — PROGRESS NOTE ADULT - PROBLEM SELECTOR PLAN 1
one day of worsening cognitive impairment + vomiting  UA negative, wbc wnl; nonseptic  CTH wnl   MR Brain confirms known diagnosis of CADASIL, no other acute findings  pending EEG results   back to baseline mental status  Neuro Dr. Bains following
one day of worsening cognitive impairment + vomiting  UA negative, wbc wnl; nonseptic  CTH wnl   MR Brain confirms known diagnosis of CADASIL, no other acute findings  EEG with no epilieptic activity  back to baseline mental status  Neuro Dr. Bains following, stable for d/c with outpt Neuro follow-up  D/c today, resume aspirin every other day

## 2025-06-12 NOTE — PROGRESS NOTE ADULT - PROBLEM SELECTOR PROBLEM 8
You need to follow-up with [facility].  The phone number for the burn clinic is [phone].  We also need to set up a profile with them, the phone number to set up for a follow-up is [phone].  To care for wounds, you need to place dry gauze dressing on them, lather them up with bacitracin.  In order to decrease infection you can wash the wounds with water and not fragrant soap.  If you have any concerns, or your wounds start to become infectious please follow-up with the ER, or follow-up with [facility].  You will need to come back either to your primary care provider or to the ER to have your sutures taken out in 10 to 14 days.  Please follow-up with orthopedics for your left knee injury.  
Prophylactic measure
Prophylactic measure

## 2025-06-12 NOTE — PROGRESS NOTE ADULT - ASSESSMENT
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 He is alert and protecting his airway. No hemorrhage or large vessel occlusion identified on neuroimaging. The patient’s neurologist is worried about an acute infectious process that is precipitating confusion in someone with decreased neurologic reserved given his Cadasil disease, verse less likely, an infarct of the area postrema leading to cyclic vomiting. Another less likely possibility is cannabinoid hyperemesis that has now triggered encephalopathy – patient does smoke marijuana daily.  However, this would be his first presentation of cannabinoid hyperemesis.      #Acute Encephalopathy (Infectious vs CVA)   #Cadasil Syndrome   #C/f Viral Gastroenteritis vs foodborne illness vs cannabinoid hyperemesis    #DM   #HTN   #Seizure Disorder on Keppra        -admit to medicine   -MR Non-con Brain - no infarct, findings consistent with CADASIL  FUrther improvement of symptoms, patient appears at baseline    -Neurology consultation: Dr. Bains (outside Neurologist cell phone number of Dr. Keith is [phone], she is happy to provide recommendations and would like to be notified of MR Brain results   -1 L fluid bolus (vomiting and elevated BUN) - creatinine lateral and stable  - prior creatinine earlier this year also 1.2-1.3  -abdominal exam benign, will hold on CT abdomen   -continue home seizure medication   -if diagnostic workup unrevealing, consider LP  - f/u neurology  -obtain urine drug screen- only positive for THC  
Ongoing SW/CM Assessment/Plan of Care Note      See SW/CM flowsheets for goals and other objective data.     Present for Rounds: Charge Nurse, , Bedside Nurse     Barriers to Discharge:  Pain management     Needs identified: Pain management consult      PT Recommendation:     Recommendation for Discharge: PT IL: Patient requires intermittent nonskilled assistance to perform mobility and/or ADLs safely    OT Recommendation:     Recommendations for Discharge: OT IL: Patient requires intermittent nonskilled assistance to perform mobility and/or ADLs safely    Disposition:  Home with friend to drive when medically cleared.      Progress note:   SW talked with the pt and his plan is to go home with his friend to drive. SW listened to the pt's concern for his dog that he was trying to stop from attacking a man that was attacking another female friend and the pt. The pt knew stepping in he was getting bit but he is going to talk someone else that could help. Pt should not have other needs from case management. WCTM.    
Patient seen and examined at bedside this morning. He is sitting up comfortably in chair, no acute complaints made. He is noted to be AOx3 today.   AM labs, vitals reviewed as above. VSS, labs with Cr 1.25.    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    #Acute Encephalopathy (Infectious vs CVA)    #Cadasil Syndrome    #C/f Viral Gastroenteritis vs foodborne illness vs cannabinoid hyperemesis     #DM    #HTN    #Seizure Disorder on Keppra      -monitor mental status closely, noted to be improving   -neurology following, rec EEG to r/o subclinical seizures   -MR Non-con Brain - no infarct, findings consistent with CADASIL   -outside Neurologist cell phone number of Dr. Keith is [phone]   -after 1 L fluid bolus (vomiting and elevated BUN) - creatinine lateral and stable   -prior creatinine earlier this year also 1.2-1.3   -abdominal exam benign, will hold off on CT abdomen    -continue home seizure medication    -urine drug screen- only positive for THC   -oobtc, ambulate as tolerated
65 yr old male with hx of CADASIL (neuro genetic disorder, sees Dr. Mrace Keith at [place]), HTN, BPH, DM, seizures on keppra 500mg BID who came to the ER for nausea/vomiting for one day and increased encephalopathy since morning of admission. 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 Admitted for Encephalopathy
65 yr old male with hx of CADASIL (neuro genetic disorder, sees Dr. Marce Keith at [place]), HTN, BPH, DM, seizures on keppra 500mg BID who came to the ER for nausea/vomiting for one day and increased encephalopathy since morning of admission. 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 Admitted for Encephalopathy

## 2025-06-12 NOTE — PROGRESS NOTE ADULT - PROBLEM SELECTOR PLAN 5
takes metformin, mounjaro  ISS  A1c: 6.0  glucose controlled
takes metformin, mounjaro  ISS  A1c: 6.0  glucose controlled

## 2025-06-12 NOTE — DISCHARGE NOTE NURSING/CASE MANAGEMENT/SOCIAL WORK - NSDCFUADDAPPT_GEN_ALL_CORE_FT
APPTS ARE READY TO BE MADE: [x] YES    Best Family or Patient Contact (if needed):    Additional Information about above appointments (if needed):    1: Pt's own Neurologist, Dr. Marce Keith, with in 1 week [phone]      Other comments or requests:

## 2025-06-12 NOTE — DISCHARGE NOTE NURSING/CASE MANAGEMENT/SOCIAL WORK - NSDCPEFALRISK_GEN_ALL_CORE
For information on Fall & Injury Prevention, visit: [email or web address] OR  [email or web address] OR  https://www.cdc.gov/steadi/patient.html

## 2025-06-12 NOTE — DISCHARGE NOTE NURSING/CASE MANAGEMENT/SOCIAL WORK - FINANCIAL ASSISTANCE
[facility] provides services at a reduced cost to those who are determined to be eligible through [facility]’s financial assistance program. Information regarding [facility]’s financial assistance program can be found by going to [email or web address] or by calling [phone].

## 2025-06-15 LAB
CULTURE RESULTS: SIGNIFICANT CHANGE UP
CULTURE RESULTS: SIGNIFICANT CHANGE UP
SPECIMEN SOURCE: SIGNIFICANT CHANGE UP
SPECIMEN SOURCE: SIGNIFICANT CHANGE UP

## 2025-07-01 ENCOUNTER — RX RENEWAL (OUTPATIENT)
Age: 65
End: 2025-07-01

## 2025-07-22 ENCOUNTER — APPOINTMENT (OUTPATIENT)
Dept: UROLOGY | Facility: CLINIC | Age: 65
End: 2025-07-22

## 2025-08-04 ENCOUNTER — APPOINTMENT (OUTPATIENT)
Dept: INTERNAL MEDICINE | Facility: CLINIC | Age: 65
End: 2025-08-04
Payer: MEDICARE

## 2025-08-04 VITALS
DIASTOLIC BLOOD PRESSURE: 60 MMHG | OXYGEN SATURATION: 96 % | WEIGHT: 225 LBS | BODY MASS INDEX: 30.48 KG/M2 | HEART RATE: 59 BPM | SYSTOLIC BLOOD PRESSURE: 94 MMHG | HEIGHT: 72 IN

## 2025-08-04 DIAGNOSIS — R56.9 UNSPECIFIED CONVULSIONS: ICD-10-CM

## 2025-08-04 DIAGNOSIS — R22.2 LOCALIZED SWELLING, MASS AND LUMP, TRUNK: ICD-10-CM

## 2025-08-04 DIAGNOSIS — E11.9 TYPE 2 DIABETES MELLITUS W/OUT COMPLICATIONS: ICD-10-CM

## 2025-08-04 DIAGNOSIS — R11.0 NAUSEA: ICD-10-CM

## 2025-08-04 DIAGNOSIS — J45.909 UNSPECIFIED ASTHMA, UNCOMPLICATED: ICD-10-CM

## 2025-08-04 DIAGNOSIS — D64.9 ANEMIA, UNSPECIFIED: ICD-10-CM

## 2025-08-04 DIAGNOSIS — D72.829 ELEVATED WHITE BLOOD CELL COUNT, UNSPECIFIED: ICD-10-CM

## 2025-08-04 DIAGNOSIS — I10 ESSENTIAL (PRIMARY) HYPERTENSION: ICD-10-CM

## 2025-08-04 DIAGNOSIS — F32.A DEPRESSION, UNSPECIFIED: ICD-10-CM

## 2025-08-04 DIAGNOSIS — E78.5 HYPERLIPIDEMIA, UNSPECIFIED: ICD-10-CM

## 2025-08-04 PROCEDURE — G2211 COMPLEX E/M VISIT ADD ON: CPT

## 2025-08-04 PROCEDURE — 99214 OFFICE O/P EST MOD 30 MIN: CPT

## 2025-08-04 PROCEDURE — 36415 COLL VENOUS BLD VENIPUNCTURE: CPT

## 2025-08-04 RX ORDER — LOSARTAN POTASSIUM 50 MG/1
50 TABLET, FILM COATED ORAL DAILY
Qty: 90 | Refills: 1 | Status: ACTIVE | COMMUNITY
Start: 2025-08-04 | End: 1900-01-01

## 2025-08-04 RX ORDER — ONDANSETRON 4 MG/1
4 TABLET, ORALLY DISINTEGRATING ORAL
Qty: 9 | Refills: 0 | Status: ACTIVE | COMMUNITY
Start: 2025-08-04 | End: 1900-01-01

## 2025-08-05 DIAGNOSIS — E87.5 HYPERKALEMIA: ICD-10-CM

## 2025-08-05 LAB
ALBUMIN SERPL ELPH-MCNC: 4.5 G/DL
ALP BLD-CCNC: 32 U/L
ALT SERPL-CCNC: 20 U/L
ANION GAP SERPL CALC-SCNC: 13 MMOL/L
AST SERPL-CCNC: 28 U/L
BASOPHILS # BLD AUTO: 0.06 K/UL
BASOPHILS NFR BLD AUTO: 0.8 %
BILIRUB SERPL-MCNC: 0.4 MG/DL
BUN SERPL-MCNC: 27 MG/DL
CALCIUM SERPL-MCNC: 9.9 MG/DL
CHLORIDE SERPL-SCNC: 107 MMOL/L
CHOLEST SERPL-MCNC: 123 MG/DL
CO2 SERPL-SCNC: 23 MMOL/L
CREAT SERPL-MCNC: 1.38 MG/DL
EGFRCR SERPLBLD CKD-EPI 2021: 56 ML/MIN/1.73M2
EOSINOPHIL # BLD AUTO: 0.17 K/UL
EOSINOPHIL NFR BLD AUTO: 2.2 %
ESTIMATED AVERAGE GLUCOSE: 126 MG/DL
GLUCOSE SERPL-MCNC: 93 MG/DL
HBA1C MFR BLD HPLC: 6 %
HCT VFR BLD CALC: 35.9 %
HDLC SERPL-MCNC: 54 MG/DL
HGB BLD-MCNC: 11.4 G/DL
IMM GRANULOCYTES NFR BLD AUTO: 0.3 %
IRON SATN MFR SERPL: 18 %
IRON SERPL-MCNC: 71 UG/DL
LDLC SERPL-MCNC: 58 MG/DL
LYMPHOCYTES # BLD AUTO: 2.39 K/UL
LYMPHOCYTES NFR BLD AUTO: 31.2 %
MAN DIFF?: NORMAL
MCHC RBC-ENTMCNC: 29.5 PG
MCHC RBC-ENTMCNC: 31.8 G/DL
MCV RBC AUTO: 92.8 FL
MONOCYTES # BLD AUTO: 0.63 K/UL
MONOCYTES NFR BLD AUTO: 8.2 %
NEUTROPHILS # BLD AUTO: 4.38 K/UL
NEUTROPHILS NFR BLD AUTO: 57.3 %
NONHDLC SERPL-MCNC: 69 MG/DL
PLATELET # BLD AUTO: 234 K/UL
POTASSIUM SERPL-SCNC: 5.6 MMOL/L
PROT SERPL-MCNC: 6.8 G/DL
RBC # BLD: 3.87 M/UL
RBC # FLD: 14.6 %
SODIUM SERPL-SCNC: 143 MMOL/L
TIBC SERPL-MCNC: 387 UG/DL
TRIGL SERPL-MCNC: 42 MG/DL
TSH SERPL-ACNC: 0.86 UIU/ML
UIBC SERPL-MCNC: 316 UG/DL
WBC # FLD AUTO: 7.65 K/UL

## 2025-08-05 RX ORDER — SODIUM POLYSTYRENE SULFONATE 15 G/60ML
15 SUSPENSION ORAL; RECTAL DAILY
Qty: 45 | Refills: 0 | Status: ACTIVE | COMMUNITY
Start: 2025-08-05 | End: 1900-01-01

## 2025-08-11 ENCOUNTER — RX RENEWAL (OUTPATIENT)
Age: 65
End: 2025-08-11

## 2025-08-12 ENCOUNTER — LABORATORY RESULT (OUTPATIENT)
Age: 65
End: 2025-08-12

## 2025-08-12 ENCOUNTER — APPOINTMENT (OUTPATIENT)
Dept: UROLOGY | Facility: CLINIC | Age: 65
End: 2025-08-12
Payer: MEDICARE

## 2025-08-12 ENCOUNTER — APPOINTMENT (OUTPATIENT)
Dept: UROLOGY | Facility: CLINIC | Age: 65
End: 2025-08-12

## 2025-08-12 VITALS
DIASTOLIC BLOOD PRESSURE: 62 MMHG | SYSTOLIC BLOOD PRESSURE: 98 MMHG | HEIGHT: 72 IN | HEART RATE: 63 BPM | BODY MASS INDEX: 30.75 KG/M2 | WEIGHT: 227 LBS | OXYGEN SATURATION: 98 %

## 2025-08-12 DIAGNOSIS — N40.0 BENIGN PROSTATIC HYPERPLASIA WITHOUT LOWER URINARY TRACT SYMPMS: ICD-10-CM

## 2025-08-12 DIAGNOSIS — Z12.5 ENCOUNTER FOR SCREENING FOR MALIGNANT NEOPLASM OF PROSTATE: ICD-10-CM

## 2025-08-12 DIAGNOSIS — R35.1 NOCTURIA: ICD-10-CM

## 2025-08-12 LAB — APO LP(A) SERPL-MCNC: 182.1 NMOL/L

## 2025-08-12 PROCEDURE — 51741 ELECTRO-UROFLOWMETRY FIRST: CPT

## 2025-08-12 PROCEDURE — 99214 OFFICE O/P EST MOD 30 MIN: CPT

## 2025-08-12 PROCEDURE — 51798 US URINE CAPACITY MEASURE: CPT

## 2025-08-13 LAB
APPEARANCE: ABNORMAL
BILIRUBIN URINE: NEGATIVE
BLOOD URINE: NEGATIVE
COLOR: NORMAL
GLUCOSE QUALITATIVE U: NEGATIVE MG/DL
KETONES URINE: ABNORMAL MG/DL
LEUKOCYTE ESTERASE URINE: NEGATIVE
NITRITE URINE: NEGATIVE
PH URINE: 5.5
PROTEIN URINE: NEGATIVE MG/DL
SPECIFIC GRAVITY URINE: 1.03
UROBILINOGEN URINE: 1 MG/DL

## 2025-08-26 ENCOUNTER — APPOINTMENT (OUTPATIENT)
Dept: INTERNAL MEDICINE | Facility: CLINIC | Age: 65
End: 2025-08-26
Payer: MEDICARE

## 2025-08-26 VITALS
OXYGEN SATURATION: 99 % | DIASTOLIC BLOOD PRESSURE: 60 MMHG | HEART RATE: 77 BPM | WEIGHT: 225 LBS | BODY MASS INDEX: 30.48 KG/M2 | SYSTOLIC BLOOD PRESSURE: 90 MMHG | HEIGHT: 72 IN

## 2025-08-26 DIAGNOSIS — D64.9 ANEMIA, UNSPECIFIED: ICD-10-CM

## 2025-08-26 DIAGNOSIS — I10 ESSENTIAL (PRIMARY) HYPERTENSION: ICD-10-CM

## 2025-08-26 DIAGNOSIS — D12.6 BENIGN NEOPLASM OF COLON, UNSPECIFIED: ICD-10-CM

## 2025-08-26 DIAGNOSIS — Z95.2 PRESENCE OF PROSTHETIC HEART VALVE: ICD-10-CM

## 2025-08-26 DIAGNOSIS — E11.9 TYPE 2 DIABETES MELLITUS W/OUT COMPLICATIONS: ICD-10-CM

## 2025-08-26 PROCEDURE — G2211 COMPLEX E/M VISIT ADD ON: CPT

## 2025-08-26 PROCEDURE — 99214 OFFICE O/P EST MOD 30 MIN: CPT

## 2025-09-05 ENCOUNTER — RX RENEWAL (OUTPATIENT)
Age: 65
End: 2025-09-05